# Patient Record
Sex: MALE | Race: WHITE | Employment: OTHER | URBAN - METROPOLITAN AREA
[De-identification: names, ages, dates, MRNs, and addresses within clinical notes are randomized per-mention and may not be internally consistent; named-entity substitution may affect disease eponyms.]

---

## 2018-07-27 ENCOUNTER — HOSPITAL ENCOUNTER (OUTPATIENT)
Age: 68
Discharge: HOME | End: 2018-07-29
Payer: MEDICARE

## 2018-07-27 ENCOUNTER — HOSPITAL ENCOUNTER (OUTPATIENT)
Dept: ULTRASOUND IMAGING | Age: 68
Discharge: HOME | End: 2018-07-29
Payer: MEDICARE

## 2018-07-27 DIAGNOSIS — I82.401 ACUTE EMBOLISM AND THROMBOSIS OF DEEP VEIN OF RIGHT LOWER EXTREMITY (HCC): ICD-10-CM

## 2018-07-27 PROCEDURE — 93971 EXTREMITY STUDY: CPT

## 2018-11-05 ENCOUNTER — HOSPITAL ENCOUNTER (OUTPATIENT)
Age: 68
Discharge: HOME | End: 2018-11-07
Payer: MEDICARE

## 2018-11-05 LAB
ANION GAP SERPL CALCULATED.3IONS-SCNC: 14 MMOL/L (ref 7–16)
BUN BLDV-MCNC: 20 MG/DL (ref 8–23)
CALCIUM SERPL-MCNC: 10 MG/DL (ref 8.6–10.2)
CHLORIDE BLD-SCNC: 99 MMOL/L (ref 98–107)
CO2: 26 MMOL/L (ref 22–29)
CREAT SERPL-MCNC: 0.9 MG/DL (ref 0.7–1.2)
FOLLICLE STIMULATING HORMONE: 4.4 MIU/ML
GFR AFRICAN AMERICAN: >60
GFR NON-AFRICAN AMERICAN: >60 ML/MIN/1.73
GLUCOSE BLD-MCNC: 140 MG/DL (ref 74–99)
LUTEINIZING HORMONE: 6.2 MIU/ML
POTASSIUM SERPL-SCNC: 4.3 MMOL/L (ref 3.5–5)
SODIUM BLD-SCNC: 139 MMOL/L (ref 132–146)
TESTOSTERONE TOTAL: 387.9 NG/DL

## 2018-11-05 PROCEDURE — 84403 ASSAY OF TOTAL TESTOSTERONE: CPT

## 2018-11-05 PROCEDURE — 80048 BASIC METABOLIC PNL TOTAL CA: CPT

## 2018-11-05 PROCEDURE — 83001 ASSAY OF GONADOTROPIN (FSH): CPT

## 2018-11-05 PROCEDURE — 83002 ASSAY OF GONADOTROPIN (LH): CPT

## 2019-05-01 ENCOUNTER — HOSPITAL ENCOUNTER (OUTPATIENT)
Age: 69
Discharge: HOME | End: 2019-05-03

## 2019-05-01 PROCEDURE — 88304 TISSUE EXAM BY PATHOLOGIST: CPT

## 2019-06-18 ENCOUNTER — HOSPITAL ENCOUNTER (OUTPATIENT)
Age: 69
Discharge: HOME | End: 2019-06-20
Payer: MEDICARE

## 2019-06-18 PROCEDURE — 88305 TISSUE EXAM BY PATHOLOGIST: CPT

## 2019-06-20 ENCOUNTER — HOSPITAL ENCOUNTER (OUTPATIENT)
Age: 69
Discharge: HOME | End: 2019-06-22
Payer: MEDICARE

## 2019-06-20 LAB — PROSTATE SPECIFIC ANTIGEN: 0.47 NG/ML (ref 0–4)

## 2019-06-20 PROCEDURE — G0103 PSA SCREENING: HCPCS

## 2020-11-13 ENCOUNTER — HOSPITAL ENCOUNTER (OUTPATIENT)
Age: 70
Discharge: HOME | End: 2020-11-15

## 2020-11-13 PROCEDURE — 88305 TISSUE EXAM BY PATHOLOGIST: CPT

## 2020-11-13 PROCEDURE — 87081 CULTURE SCREEN ONLY: CPT

## 2020-11-14 LAB — CLOTEST: NORMAL

## 2022-01-05 RX ORDER — ALBUTEROL SULFATE 90 UG/1
4 AEROSOL, METERED RESPIRATORY (INHALATION) PRN
Status: CANCELLED | OUTPATIENT
Start: 2022-01-05

## 2022-01-05 RX ORDER — SODIUM CHLORIDE 9 MG/ML
INJECTION, SOLUTION INTRAVENOUS CONTINUOUS PRN
Status: CANCELLED | OUTPATIENT
Start: 2022-01-05 | End: 2022-01-06

## 2022-01-05 RX ORDER — METHYLPREDNISOLONE SODIUM SUCCINATE 125 MG/2ML
125 INJECTION, POWDER, LYOPHILIZED, FOR SOLUTION INTRAMUSCULAR; INTRAVENOUS
Status: CANCELLED | OUTPATIENT
Start: 2022-01-05 | End: 2022-01-05

## 2022-01-05 RX ORDER — SODIUM CHLORIDE 0.9 % (FLUSH) 0.9 %
5-40 SYRINGE (ML) INJECTION EVERY 12 HOURS SCHEDULED
Status: CANCELLED | OUTPATIENT
Start: 2022-01-05

## 2022-01-05 RX ORDER — SODIUM CHLORIDE 9 MG/ML
100 INJECTION, SOLUTION INTRAVENOUS CONTINUOUS PRN
Status: CANCELLED | OUTPATIENT
Start: 2022-01-05

## 2022-01-05 RX ORDER — ACETAMINOPHEN 325 MG/1
650 TABLET ORAL
Status: CANCELLED | OUTPATIENT
Start: 2022-01-05 | End: 2022-01-05

## 2022-01-05 RX ORDER — SODIUM CHLORIDE 0.9 % (FLUSH) 0.9 %
5-40 SYRINGE (ML) INJECTION PRN
Status: CANCELLED | OUTPATIENT
Start: 2022-01-05

## 2022-01-05 RX ORDER — EPINEPHRINE 1 MG/ML
0.3 INJECTION, SOLUTION, CONCENTRATE INTRAVENOUS PRN
Status: CANCELLED | OUTPATIENT
Start: 2022-01-05

## 2022-01-05 RX ORDER — ONDANSETRON 2 MG/ML
8 INJECTION INTRAMUSCULAR; INTRAVENOUS
Status: CANCELLED | OUTPATIENT
Start: 2022-01-05 | End: 2022-01-05

## 2022-01-05 RX ORDER — DIPHENHYDRAMINE HYDROCHLORIDE 50 MG/ML
50 INJECTION INTRAMUSCULAR; INTRAVENOUS
Status: CANCELLED | OUTPATIENT
Start: 2022-01-05 | End: 2022-01-05

## 2022-01-05 RX ORDER — SODIUM CHLORIDE 9 MG/ML
25 INJECTION, SOLUTION INTRAVENOUS PRN
Status: CANCELLED | OUTPATIENT
Start: 2022-01-05

## 2022-01-06 ENCOUNTER — HOSPITAL ENCOUNTER (OUTPATIENT)
Dept: INFUSION THERAPY | Age: 72
Setting detail: INFUSION SERIES
Discharge: HOME | End: 2022-01-06
Payer: MEDICARE

## 2022-01-06 VITALS
DIASTOLIC BLOOD PRESSURE: 62 MMHG | HEART RATE: 67 BPM | OXYGEN SATURATION: 96 % | HEIGHT: 72 IN | WEIGHT: 235 LBS | SYSTOLIC BLOOD PRESSURE: 122 MMHG | BODY MASS INDEX: 31.83 KG/M2 | RESPIRATION RATE: 16 BRPM | TEMPERATURE: 97.6 F

## 2022-01-06 PROCEDURE — 2580000003 HC RX 258: Performed by: FAMILY MEDICINE

## 2022-01-06 PROCEDURE — M0243 CASIRIVI AND IMDEVI INFUSION: HCPCS

## 2022-01-06 PROCEDURE — 6360000002 HC RX W HCPCS: Performed by: FAMILY MEDICINE

## 2022-01-06 RX ORDER — SODIUM CHLORIDE 9 MG/ML
INJECTION, SOLUTION INTRAVENOUS CONTINUOUS PRN
Status: ACTIVE | OUTPATIENT
Start: 2022-01-06 | End: 2022-01-06

## 2022-01-06 RX ORDER — SODIUM CHLORIDE 0.9 % (FLUSH) 0.9 %
5-40 SYRINGE (ML) INJECTION PRN
Status: DISCONTINUED | OUTPATIENT
Start: 2022-01-06 | End: 2022-01-07 | Stop reason: HOSPADM

## 2022-01-06 RX ADMIN — SODIUM CHLORIDE, PRESERVATIVE FREE 10 ML: 5 INJECTION INTRAVENOUS at 13:48

## 2022-01-06 RX ADMIN — SODIUM CHLORIDE: 9 INJECTION, SOLUTION INTRAVENOUS at 12:45

## 2022-01-06 RX ADMIN — SODIUM CHLORIDE, PRESERVATIVE FREE 10 ML: 5 INJECTION INTRAVENOUS at 12:45

## 2022-01-06 RX ADMIN — Medication 1200 MG: at 12:46

## 2022-01-06 NOTE — PROGRESS NOTES
Patient arrived to receive Covid Monoclonal infusion. Patient was given education handouts on the medication and information sheet on 10 things to do when you have COVID-19. Patient was explained the risk and benefits of receiving the infusion. Verbal consent was obtained.

## 2022-10-11 ENCOUNTER — HOSPITAL ENCOUNTER (OUTPATIENT)
Dept: ULTRASOUND IMAGING | Age: 72
Discharge: HOME | End: 2022-10-13
Payer: MEDICARE

## 2022-10-11 ENCOUNTER — HOSPITAL ENCOUNTER (OUTPATIENT)
Age: 72
Discharge: HOME | End: 2022-10-13
Payer: MEDICARE

## 2022-10-11 DIAGNOSIS — I10 ESSENTIAL HYPERTENSION, BENIGN: ICD-10-CM

## 2022-10-11 PROCEDURE — 76705 ECHO EXAM OF ABDOMEN: CPT

## 2023-09-22 ENCOUNTER — TRANSCRIBE ORDERS (OUTPATIENT)
Dept: ADMINISTRATIVE | Age: 73
End: 2023-09-22

## 2023-09-22 DIAGNOSIS — M54.2 CERVICALGIA: Primary | ICD-10-CM

## 2024-01-10 ENCOUNTER — HOSPITAL ENCOUNTER (OUTPATIENT)
Dept: CT IMAGING | Age: 74
Discharge: HOME | End: 2024-01-12
Payer: MEDICARE

## 2024-01-10 ENCOUNTER — HOSPITAL ENCOUNTER (OUTPATIENT)
Age: 74
Discharge: HOME | End: 2024-01-12
Payer: MEDICARE

## 2024-01-10 DIAGNOSIS — R10.9 STOMACH ACHE: ICD-10-CM

## 2024-01-10 PROCEDURE — 74176 CT ABD & PELVIS W/O CONTRAST: CPT

## 2024-02-14 DIAGNOSIS — N28.1 ACQUIRED CYST OF KIDNEY: Primary | ICD-10-CM

## 2025-05-08 ENCOUNTER — HOSPITAL ENCOUNTER (EMERGENCY)
Age: 75
Discharge: HOME | End: 2025-05-08
Attending: EMERGENCY MEDICINE
Payer: MEDICARE

## 2025-05-08 VITALS
WEIGHT: 202 LBS | HEIGHT: 71 IN | HEART RATE: 81 BPM | DIASTOLIC BLOOD PRESSURE: 75 MMHG | TEMPERATURE: 98.1 F | SYSTOLIC BLOOD PRESSURE: 148 MMHG | OXYGEN SATURATION: 98 % | BODY MASS INDEX: 28.28 KG/M2 | RESPIRATION RATE: 15 BRPM

## 2025-05-08 DIAGNOSIS — R73.9 HYPERGLYCEMIA: ICD-10-CM

## 2025-05-08 DIAGNOSIS — E86.0 DEHYDRATION: Primary | ICD-10-CM

## 2025-05-08 LAB
ALBUMIN SERPL-MCNC: 3.9 G/DL (ref 3.5–5.2)
ALP SERPL-CCNC: 83 U/L (ref 40–129)
ALT SERPL-CCNC: 13 U/L (ref 0–40)
ANION GAP SERPL CALCULATED.3IONS-SCNC: 11 MMOL/L (ref 7–16)
AST SERPL-CCNC: 33 U/L (ref 0–39)
BASOPHILS # BLD: 0.03 K/UL (ref 0–0.2)
BASOPHILS NFR BLD: 0 % (ref 0–2)
BILIRUB DIRECT SERPL-MCNC: <0.2 MG/DL (ref 0–0.3)
BILIRUB INDIRECT SERPL-MCNC: NORMAL MG/DL (ref 0–1)
BILIRUB SERPL-MCNC: 0.8 MG/DL (ref 0–1.2)
BILIRUB UR QL STRIP: NEGATIVE
BUN SERPL-MCNC: 13 MG/DL (ref 6–23)
CALCIUM SERPL-MCNC: 9.8 MG/DL (ref 8.6–10.2)
CHLORIDE SERPL-SCNC: 100 MMOL/L (ref 98–107)
CLARITY UR: CLEAR
CO2 SERPL-SCNC: 22 MMOL/L (ref 22–29)
COLOR UR: YELLOW
CREAT SERPL-MCNC: 1 MG/DL (ref 0.7–1.2)
EOSINOPHIL # BLD: 0.06 K/UL (ref 0.05–0.5)
EOSINOPHILS RELATIVE PERCENT: 1 % (ref 0–6)
ERYTHROCYTE [DISTWIDTH] IN BLOOD BY AUTOMATED COUNT: 12.9 % (ref 11.5–15)
GFR, ESTIMATED: 82 ML/MIN/1.73M2
GLUCOSE BLD-MCNC: 218 MG/DL (ref 74–99)
GLUCOSE SERPL-MCNC: 227 MG/DL (ref 74–99)
GLUCOSE UR STRIP-MCNC: 250 MG/DL
HCT VFR BLD AUTO: 40.8 % (ref 37–54)
HGB BLD-MCNC: 13.6 G/DL (ref 12.5–16.5)
HGB UR QL STRIP.AUTO: ABNORMAL
IMM GRANULOCYTES # BLD AUTO: 0.05 K/UL (ref 0–0.58)
IMM GRANULOCYTES NFR BLD: 0 % (ref 0–5)
KETONES UR STRIP-MCNC: 15 MG/DL
LACTATE BLDV-SCNC: 1.1 MMOL/L (ref 0.5–1.9)
LEUKOCYTE ESTERASE UR QL STRIP: NEGATIVE
LYMPHOCYTES NFR BLD: 1.93 K/UL (ref 1.5–4)
LYMPHOCYTES RELATIVE PERCENT: 17 % (ref 20–42)
MCH RBC QN AUTO: 31 PG (ref 26–35)
MCHC RBC AUTO-ENTMCNC: 33.3 G/DL (ref 32–34.5)
MCV RBC AUTO: 92.9 FL (ref 80–99.9)
MONOCYTES NFR BLD: 0.88 K/UL (ref 0.1–0.95)
MONOCYTES NFR BLD: 8 % (ref 2–12)
NEUTROPHILS NFR BLD: 74 % (ref 43–80)
NEUTS SEG NFR BLD: 8.22 K/UL (ref 1.8–7.3)
NITRITE UR QL STRIP: NEGATIVE
PH UR STRIP: 6 [PH] (ref 5–8)
PLATELET CONFIRMATION: NORMAL
PLATELET, FLUORESCENCE: 253 K/UL (ref 130–450)
PMV BLD AUTO: 10.3 FL (ref 7–12)
POTASSIUM SERPL-SCNC: 5.2 MMOL/L (ref 3.5–5)
PROT SERPL-MCNC: 7.5 G/DL (ref 6.4–8.3)
PROT UR STRIP-MCNC: 100 MG/DL
RBC # BLD AUTO: 4.39 M/UL (ref 3.8–5.8)
RBC #/AREA URNS HPF: ABNORMAL /HPF
SODIUM SERPL-SCNC: 133 MMOL/L (ref 132–146)
SP GR UR STRIP: 1.02 (ref 1–1.03)
UROBILINOGEN UR STRIP-ACNC: 0.2 EU/DL (ref 0–1)
WBC #/AREA URNS HPF: ABNORMAL /HPF
WBC OTHER # BLD: 11.2 K/UL (ref 4.5–11.5)

## 2025-05-08 PROCEDURE — 82248 BILIRUBIN DIRECT: CPT

## 2025-05-08 PROCEDURE — 93005 ELECTROCARDIOGRAM TRACING: CPT | Performed by: EMERGENCY MEDICINE

## 2025-05-08 PROCEDURE — 82962 GLUCOSE BLOOD TEST: CPT

## 2025-05-08 PROCEDURE — 96360 HYDRATION IV INFUSION INIT: CPT

## 2025-05-08 PROCEDURE — 81001 URINALYSIS AUTO W/SCOPE: CPT

## 2025-05-08 PROCEDURE — 80053 COMPREHEN METABOLIC PANEL: CPT

## 2025-05-08 PROCEDURE — 85025 COMPLETE CBC W/AUTO DIFF WBC: CPT

## 2025-05-08 PROCEDURE — 99284 EMERGENCY DEPT VISIT MOD MDM: CPT

## 2025-05-08 PROCEDURE — 87040 BLOOD CULTURE FOR BACTERIA: CPT

## 2025-05-08 PROCEDURE — 6370000000 HC RX 637 (ALT 250 FOR IP): Performed by: EMERGENCY MEDICINE

## 2025-05-08 PROCEDURE — 2580000003 HC RX 258: Performed by: EMERGENCY MEDICINE

## 2025-05-08 PROCEDURE — 87086 URINE CULTURE/COLONY COUNT: CPT

## 2025-05-08 PROCEDURE — 83605 ASSAY OF LACTIC ACID: CPT

## 2025-05-08 PROCEDURE — 96361 HYDRATE IV INFUSION ADD-ON: CPT

## 2025-05-08 RX ORDER — 0.9 % SODIUM CHLORIDE 0.9 %
1000 INTRAVENOUS SOLUTION INTRAVENOUS ONCE
Status: COMPLETED | OUTPATIENT
Start: 2025-05-08 | End: 2025-05-08

## 2025-05-08 RX ORDER — IBUPROFEN 600 MG/1
600 TABLET, FILM COATED ORAL ONCE
Status: COMPLETED | OUTPATIENT
Start: 2025-05-08 | End: 2025-05-08

## 2025-05-08 RX ADMIN — SODIUM CHLORIDE 1000 ML: 9 INJECTION, SOLUTION INTRAVENOUS at 14:51

## 2025-05-08 RX ADMIN — IBUPROFEN 600 MG: 600 TABLET, FILM COATED ORAL at 15:24

## 2025-05-08 NOTE — ED PROVIDER NOTES
HPI:  5/8/25, Time: 12:18 PM EDT         Bob Nash is a 75 y.o. male presenting to the ED for chills. Patient states that about 10 days ago he had multiple tooth extractions and implants placed.  He states that he is been having intermittent chills with pain and difficulty sleeping and eating since.  He states he has been taking hydrocodone with Tylenol.  He has a history of ELIZABETH.  He states he has been constipated and also having difficulty urinating but no dysuria.  No documented fevers, cough, URI symptoms, chest pain, shortness of breath, abdominal pain, emesis, or diarrhea.  Called his PCP recommended that he come to the ED to be evaluated for sepsis.  Patient has stable vital signs on arrival.    I reviewed the patient's chart.  Patient seen by neurosurgery at [facility] on 9/29/2023 for left-sided neck pain without inciting event.  Symptoms likely muscular.  Patient has a history of BPH, diabetes, hypertension, and CKD.    Review of Systems:  Pertinent positives and negatives as per HPI.    --------------------------------------------- PAST HISTORY ---------------------------------------------  Past Medical History:  has no past medical history on file.    Past Surgical History:  has no past surgical history on file.    Social History:      Family History: family history is not on file.     The patient’s home medications have been reviewed.    Allergies: Pcn [penicillins] and Sulfa antibiotics    -------------------------------------------------- RESULTS -------------------------------------------------  All laboratory and radiology results have been personally reviewed by myself   LABS:  Results for orders placed or performed during the hospital encounter of 05/08/25   Culture, Blood 1    Specimen: Blood   Result Value Ref Range    Specimen Description .BLOOD     Special Requests          Culture NO GROWTH <24 HRS    Culture, Blood 2    Specimen: Blood   Result Value Ref Range    Specimen Description .BLOOD

## 2025-05-09 LAB
EKG ATRIAL RATE: 64 BPM
EKG P AXIS: 2 DEGREES
EKG P-R INTERVAL: 222 MS
EKG Q-T INTERVAL: 374 MS
EKG QRS DURATION: 88 MS
EKG QTC CALCULATION (BAZETT): 385 MS
EKG R AXIS: -9 DEGREES
EKG T AXIS: 20 DEGREES
EKG VENTRICULAR RATE: 64 BPM
MICROORGANISM SPEC CULT: NO GROWTH
SERVICE CMNT-IMP: NORMAL
SPECIMEN DESCRIPTION: NORMAL

## 2025-05-09 PROCEDURE — 93010 ELECTROCARDIOGRAM REPORT: CPT | Performed by: INTERNAL MEDICINE

## 2025-05-11 LAB
MICROORGANISM SPEC CULT: NORMAL
MICROORGANISM SPEC CULT: NORMAL
SERVICE CMNT-IMP: NORMAL
SERVICE CMNT-IMP: NORMAL
SPECIMEN DESCRIPTION: NORMAL
SPECIMEN DESCRIPTION: NORMAL

## 2025-05-19 ENCOUNTER — HOSPITAL ENCOUNTER (EMERGENCY)
Age: 75
Discharge: HOME | DRG: 158 | End: 2025-05-19
Attending: EMERGENCY MEDICINE
Payer: MEDICARE

## 2025-05-19 ENCOUNTER — APPOINTMENT (OUTPATIENT)
Dept: CT IMAGING | Age: 75
DRG: 158 | End: 2025-05-19
Payer: MEDICARE

## 2025-05-19 VITALS
RESPIRATION RATE: 16 BRPM | TEMPERATURE: 97.5 F | SYSTOLIC BLOOD PRESSURE: 145 MMHG | DIASTOLIC BLOOD PRESSURE: 82 MMHG | OXYGEN SATURATION: 97 % | BODY MASS INDEX: 28.28 KG/M2 | HEART RATE: 69 BPM | WEIGHT: 202 LBS | HEIGHT: 71 IN

## 2025-05-19 DIAGNOSIS — M27.62: Primary | ICD-10-CM

## 2025-05-19 DIAGNOSIS — K08.89 PAIN, DENTAL: ICD-10-CM

## 2025-05-19 LAB
ANION GAP SERPL CALCULATED.3IONS-SCNC: 13 MMOL/L (ref 7–16)
BASOPHILS # BLD: 0.04 K/UL (ref 0–0.2)
BASOPHILS NFR BLD: 1 % (ref 0–2)
BUN SERPL-MCNC: 21 MG/DL (ref 6–23)
CALCIUM SERPL-MCNC: 10.2 MG/DL (ref 8.6–10.2)
CHLORIDE SERPL-SCNC: 103 MMOL/L (ref 98–107)
CO2 SERPL-SCNC: 21 MMOL/L (ref 22–29)
CREAT SERPL-MCNC: 1.3 MG/DL (ref 0.7–1.2)
CRP SERPL HS-MCNC: 4.5 MG/L (ref 0–5)
EOSINOPHIL # BLD: 0.13 K/UL (ref 0.05–0.5)
EOSINOPHILS RELATIVE PERCENT: 2 % (ref 0–6)
ERYTHROCYTE [DISTWIDTH] IN BLOOD BY AUTOMATED COUNT: 12.6 % (ref 11.5–15)
GFR, ESTIMATED: 59 ML/MIN/1.73M2
GLUCOSE BLD-MCNC: 148 MG/DL (ref 74–99)
GLUCOSE SERPL-MCNC: 142 MG/DL (ref 74–99)
HCT VFR BLD AUTO: 37.7 % (ref 37–54)
HGB BLD-MCNC: 12.6 G/DL (ref 12.5–16.5)
IMM GRANULOCYTES # BLD AUTO: 0.03 K/UL (ref 0–0.58)
IMM GRANULOCYTES NFR BLD: 0 % (ref 0–5)
LYMPHOCYTES NFR BLD: 1.87 K/UL (ref 1.5–4)
LYMPHOCYTES RELATIVE PERCENT: 22 % (ref 20–42)
MCH RBC QN AUTO: 30.8 PG (ref 26–35)
MCHC RBC AUTO-ENTMCNC: 33.4 G/DL (ref 32–34.5)
MCV RBC AUTO: 92.2 FL (ref 80–99.9)
MONOCYTES NFR BLD: 0.47 K/UL (ref 0.1–0.95)
MONOCYTES NFR BLD: 6 % (ref 2–12)
NEUTROPHILS NFR BLD: 70 % (ref 43–80)
NEUTS SEG NFR BLD: 5.8 K/UL (ref 1.8–7.3)
PLATELET # BLD AUTO: 324 K/UL (ref 130–450)
PMV BLD AUTO: 9.7 FL (ref 7–12)
POTASSIUM SERPL-SCNC: 4.4 MMOL/L (ref 3.5–5)
RBC # BLD AUTO: 4.09 M/UL (ref 3.8–5.8)
SODIUM SERPL-SCNC: 137 MMOL/L (ref 132–146)
WBC OTHER # BLD: 8.3 K/UL (ref 4.5–11.5)

## 2025-05-19 PROCEDURE — 87205 SMEAR GRAM STAIN: CPT

## 2025-05-19 PROCEDURE — 96374 THER/PROPH/DIAG INJ IV PUSH: CPT

## 2025-05-19 PROCEDURE — 2580000003 HC RX 258: Performed by: PHYSICIAN ASSISTANT

## 2025-05-19 PROCEDURE — 87070 CULTURE OTHR SPECIMN AEROBIC: CPT

## 2025-05-19 PROCEDURE — 6360000004 HC RX CONTRAST MEDICATION: Performed by: RADIOLOGY

## 2025-05-19 PROCEDURE — 99285 EMERGENCY DEPT VISIT HI MDM: CPT

## 2025-05-19 PROCEDURE — 86140 C-REACTIVE PROTEIN: CPT

## 2025-05-19 PROCEDURE — 87077 CULTURE AEROBIC IDENTIFY: CPT

## 2025-05-19 PROCEDURE — 85025 COMPLETE CBC W/AUTO DIFF WBC: CPT

## 2025-05-19 PROCEDURE — 82962 GLUCOSE BLOOD TEST: CPT

## 2025-05-19 PROCEDURE — 6360000002 HC RX W HCPCS: Performed by: PHYSICIAN ASSISTANT

## 2025-05-19 PROCEDURE — 87075 CULTR BACTERIA EXCEPT BLOOD: CPT

## 2025-05-19 PROCEDURE — 70487 CT MAXILLOFACIAL W/DYE: CPT

## 2025-05-19 PROCEDURE — 96375 TX/PRO/DX INJ NEW DRUG ADDON: CPT

## 2025-05-19 PROCEDURE — 87040 BLOOD CULTURE FOR BACTERIA: CPT

## 2025-05-19 PROCEDURE — 80048 BASIC METABOLIC PNL TOTAL CA: CPT

## 2025-05-19 RX ORDER — MORPHINE SULFATE 4 MG/ML
4 INJECTION, SOLUTION INTRAMUSCULAR; INTRAVENOUS ONCE
Refills: 0 | Status: DISCONTINUED | OUTPATIENT
Start: 2025-05-19 | End: 2025-05-19

## 2025-05-19 RX ORDER — ONDANSETRON 2 MG/ML
4 INJECTION INTRAMUSCULAR; INTRAVENOUS EVERY 6 HOURS PRN
Status: DISCONTINUED | OUTPATIENT
Start: 2025-05-19 | End: 2025-05-20 | Stop reason: HOSPADM

## 2025-05-19 RX ORDER — IOPAMIDOL 755 MG/ML
75 INJECTION, SOLUTION INTRAVASCULAR
Status: COMPLETED | OUTPATIENT
Start: 2025-05-19 | End: 2025-05-19

## 2025-05-19 RX ORDER — HYDROCODONE BITARTRATE AND ACETAMINOPHEN 5; 325 MG/1; MG/1
1 TABLET ORAL EVERY 6 HOURS PRN
Qty: 12 TABLET | Refills: 0 | Status: ON HOLD | OUTPATIENT
Start: 2025-05-19 | End: 2025-05-23 | Stop reason: HOSPADM

## 2025-05-19 RX ORDER — MORPHINE SULFATE 2 MG/ML
2 INJECTION, SOLUTION INTRAMUSCULAR; INTRAVENOUS ONCE
Status: COMPLETED | OUTPATIENT
Start: 2025-05-19 | End: 2025-05-19

## 2025-05-19 RX ORDER — 0.9 % SODIUM CHLORIDE 0.9 %
500 INTRAVENOUS SOLUTION INTRAVENOUS ONCE
Status: COMPLETED | OUTPATIENT
Start: 2025-05-19 | End: 2025-05-19

## 2025-05-19 RX ADMIN — MORPHINE SULFATE 2 MG: 2 INJECTION, SOLUTION INTRAMUSCULAR; INTRAVENOUS at 22:25

## 2025-05-19 RX ADMIN — ONDANSETRON 4 MG: 2 INJECTION, SOLUTION INTRAMUSCULAR; INTRAVENOUS at 22:26

## 2025-05-19 RX ADMIN — IOPAMIDOL 75 ML: 755 INJECTION, SOLUTION INTRAVENOUS at 20:22

## 2025-05-19 RX ADMIN — SODIUM CHLORIDE 500 ML: 0.9 INJECTION, SOLUTION INTRAVENOUS at 19:22

## 2025-05-19 ASSESSMENT — PAIN DESCRIPTION - LOCATION
LOCATION: TEETH
LOCATION: TEETH
LOCATION: TEETH;MOUTH

## 2025-05-19 ASSESSMENT — PAIN SCALES - GENERAL
PAINLEVEL_OUTOF10: 6
PAINLEVEL_OUTOF10: 3
PAINLEVEL_OUTOF10: 8

## 2025-05-19 ASSESSMENT — PAIN DESCRIPTION - ORIENTATION
ORIENTATION: MID;UPPER
ORIENTATION: MID;UPPER

## 2025-05-19 ASSESSMENT — PAIN - FUNCTIONAL ASSESSMENT: PAIN_FUNCTIONAL_ASSESSMENT: 0-10

## 2025-05-19 ASSESSMENT — LIFESTYLE VARIABLES
HOW OFTEN DO YOU HAVE A DRINK CONTAINING ALCOHOL: NEVER
HOW MANY STANDARD DRINKS CONTAINING ALCOHOL DO YOU HAVE ON A TYPICAL DAY: PATIENT DOES NOT DRINK

## 2025-05-19 ASSESSMENT — PAIN DESCRIPTION - DESCRIPTORS: DESCRIPTORS: THROBBING

## 2025-05-20 ENCOUNTER — APPOINTMENT (OUTPATIENT)
Dept: GENERAL RADIOLOGY | Age: 75
DRG: 920 | End: 2025-05-20
Payer: MEDICARE

## 2025-05-20 ENCOUNTER — HOSPITAL ENCOUNTER (INPATIENT)
Age: 75
LOS: 3 days | Discharge: HOME HEALTH | DRG: 920 | End: 2025-05-23
Attending: EMERGENCY MEDICINE | Admitting: INTERNAL MEDICINE
Payer: MEDICARE

## 2025-05-20 DIAGNOSIS — R07.9 CHEST PAIN, UNSPECIFIED TYPE: ICD-10-CM

## 2025-05-20 DIAGNOSIS — K08.89 PAIN, DENTAL: ICD-10-CM

## 2025-05-20 DIAGNOSIS — M27.62: Primary | ICD-10-CM

## 2025-05-20 LAB
ALBUMIN SERPL-MCNC: 4.2 G/DL (ref 3.5–5.2)
ALP SERPL-CCNC: 84 U/L (ref 40–129)
ALT SERPL-CCNC: 14 U/L (ref 0–50)
ANION GAP SERPL CALCULATED.3IONS-SCNC: 14 MMOL/L (ref 7–16)
AST SERPL-CCNC: 20 U/L (ref 0–50)
BASOPHILS # BLD: 0.03 K/UL (ref 0–0.2)
BASOPHILS NFR BLD: 0 % (ref 0–2)
BILIRUB SERPL-MCNC: 0.3 MG/DL (ref 0–1.2)
BUN SERPL-MCNC: 23 MG/DL (ref 8–23)
CALCIUM SERPL-MCNC: 10.2 MG/DL (ref 8.8–10.2)
CHLORIDE SERPL-SCNC: 104 MMOL/L (ref 98–107)
CO2 SERPL-SCNC: 22 MMOL/L (ref 22–29)
CREAT SERPL-MCNC: 1.3 MG/DL (ref 0.7–1.2)
EOSINOPHIL # BLD: 0.1 K/UL (ref 0.05–0.5)
EOSINOPHILS RELATIVE PERCENT: 1 % (ref 0–6)
ERYTHROCYTE [DISTWIDTH] IN BLOOD BY AUTOMATED COUNT: 12.7 % (ref 11.5–15)
GFR, ESTIMATED: 56 ML/MIN/1.73M2
GLUCOSE SERPL-MCNC: 172 MG/DL (ref 74–99)
HCT VFR BLD AUTO: 37.7 % (ref 37–54)
HGB BLD-MCNC: 12.8 G/DL (ref 12.5–16.5)
IMM GRANULOCYTES # BLD AUTO: 0.03 K/UL (ref 0–0.58)
IMM GRANULOCYTES NFR BLD: 0 % (ref 0–5)
LYMPHOCYTES NFR BLD: 2.04 K/UL (ref 1.5–4)
LYMPHOCYTES RELATIVE PERCENT: 26 % (ref 20–42)
MCH RBC QN AUTO: 31.1 PG (ref 26–35)
MCHC RBC AUTO-ENTMCNC: 34 G/DL (ref 32–34.5)
MCV RBC AUTO: 91.7 FL (ref 80–99.9)
MONOCYTES NFR BLD: 0.47 K/UL (ref 0.1–0.95)
MONOCYTES NFR BLD: 6 % (ref 2–12)
NEUTROPHILS NFR BLD: 66 % (ref 43–80)
NEUTS SEG NFR BLD: 5.21 K/UL (ref 1.8–7.3)
PLATELET # BLD AUTO: 315 K/UL (ref 130–450)
PMV BLD AUTO: 9.9 FL (ref 7–12)
POTASSIUM SERPL-SCNC: 4.6 MMOL/L (ref 3.5–5.1)
PROT SERPL-MCNC: 7.6 G/DL (ref 6.4–8.3)
RBC # BLD AUTO: 4.11 M/UL (ref 3.8–5.8)
SODIUM SERPL-SCNC: 140 MMOL/L (ref 136–145)
TROPONIN I SERPL HS-MCNC: 20 NG/L (ref 0–22)
TROPONIN I SERPL HS-MCNC: 25 NG/L (ref 0–22)
WBC OTHER # BLD: 7.9 K/UL (ref 4.5–11.5)

## 2025-05-20 PROCEDURE — 96375 TX/PRO/DX INJ NEW DRUG ADDON: CPT

## 2025-05-20 PROCEDURE — 93005 ELECTROCARDIOGRAM TRACING: CPT | Performed by: NURSE PRACTITIONER

## 2025-05-20 PROCEDURE — 96365 THER/PROPH/DIAG IV INF INIT: CPT

## 2025-05-20 PROCEDURE — 99285 EMERGENCY DEPT VISIT HI MDM: CPT

## 2025-05-20 PROCEDURE — 85025 COMPLETE CBC W/AUTO DIFF WBC: CPT

## 2025-05-20 PROCEDURE — 2580000003 HC RX 258: Performed by: NURSE PRACTITIONER

## 2025-05-20 PROCEDURE — 84484 ASSAY OF TROPONIN QUANT: CPT

## 2025-05-20 PROCEDURE — 2060000000 HC ICU INTERMEDIATE R&B

## 2025-05-20 PROCEDURE — 6370000000 HC RX 637 (ALT 250 FOR IP): Performed by: NURSE PRACTITIONER

## 2025-05-20 PROCEDURE — 71045 X-RAY EXAM CHEST 1 VIEW: CPT

## 2025-05-20 PROCEDURE — 80053 COMPREHEN METABOLIC PANEL: CPT

## 2025-05-20 PROCEDURE — 6360000002 HC RX W HCPCS: Performed by: NURSE PRACTITIONER

## 2025-05-20 PROCEDURE — 02HV33Z INSERTION OF INFUSION DEVICE INTO SUPERIOR VENA CAVA, PERCUTANEOUS APPROACH: ICD-10-PCS | Performed by: INTERNAL MEDICINE

## 2025-05-20 RX ORDER — KETOROLAC TROMETHAMINE 30 MG/ML
15 INJECTION, SOLUTION INTRAMUSCULAR; INTRAVENOUS ONCE
Status: COMPLETED | OUTPATIENT
Start: 2025-05-20 | End: 2025-05-20

## 2025-05-20 RX ORDER — OXYCODONE HYDROCHLORIDE 5 MG/1
5 TABLET ORAL ONCE
Refills: 0 | Status: COMPLETED | OUTPATIENT
Start: 2025-05-20 | End: 2025-05-20

## 2025-05-20 RX ORDER — OXYCODONE HYDROCHLORIDE 5 MG/1
5 TABLET ORAL ONCE
Refills: 0 | Status: COMPLETED | OUTPATIENT
Start: 2025-05-20 | End: 2025-05-21

## 2025-05-20 RX ORDER — ASPIRIN 81 MG/1
324 TABLET, CHEWABLE ORAL ONCE
Status: COMPLETED | OUTPATIENT
Start: 2025-05-20 | End: 2025-05-20

## 2025-05-20 RX ADMIN — OXYCODONE HYDROCHLORIDE 5 MG: 5 TABLET ORAL at 21:00

## 2025-05-20 RX ADMIN — ASPIRIN 81 MG CHEWABLE TABLET 324 MG: 81 TABLET CHEWABLE at 22:01

## 2025-05-20 RX ADMIN — DOXYCYCLINE 100 MG: 100 INJECTION, POWDER, LYOPHILIZED, FOR SOLUTION INTRAVENOUS at 21:14

## 2025-05-20 RX ADMIN — KETOROLAC TROMETHAMINE 15 MG: 30 INJECTION, SOLUTION INTRAMUSCULAR at 20:59

## 2025-05-20 ASSESSMENT — PAIN DESCRIPTION - LOCATION
LOCATION: JAW
LOCATION: MOUTH

## 2025-05-20 ASSESSMENT — PAIN - FUNCTIONAL ASSESSMENT: PAIN_FUNCTIONAL_ASSESSMENT: 0-10

## 2025-05-20 ASSESSMENT — PAIN SCALES - GENERAL
PAINLEVEL_OUTOF10: 8
PAINLEVEL_OUTOF10: 8

## 2025-05-20 ASSESSMENT — PAIN DESCRIPTION - DESCRIPTORS: DESCRIPTORS: DISCOMFORT;NAGGING;THROBBING

## 2025-05-20 ASSESSMENT — HEART SCORE: ECG: NORMAL

## 2025-05-20 NOTE — ED PROVIDER NOTES
Shared RADU-ED Attending Visit.  CC: No      [place]  Department of Emergency Medicine   ED  Encounter Note  Admit Date/RoomTime: 2025  6:02 PM  ED Room:     NAME: Bob Nash  : 1950  MRN: [id number]     Chief Complaint:  Dental Problem (Had several implants today; sent here by dental today for infection/pain )    History of Present Illness        Bob Nash is a 75 y.o. old male who presents to the emergency department by private vehicle, for post-operative left upper dental and mouth pain, which occured 3 week(s) prior to arrival.  Pt had several teeth pulled and implant posts placed by his dentist and was clindamycin initially. He had persistent pain and swelling. His dentist put him on doxycycline next.  He was referred to Dr. Apple for second opinion.  He saw Dr. Apple this morning whose suggestion was extraction of all upper teeth, healing and reeval at that time for implants vs denture.  He went to his dentist this afternoon who removed one of the implant posts and cleaned out the area and he reports she said there was significant bony involvement and she consulted with Dr. Apple who suggested pt go to ED for IV antibiotics.  Pt denies fevers but has been having chills. He has been using motrin, tylenol, and norco for pain and is developing stomach upset. Pt has implant post from dentist with him.    ROS   Pertinent positives and negatives are stated within HPI, all other systems reviewed and are negative.    Past Medical History:  has no past medical history on file.    Surgical History:  has no past surgical history on file.    Social History:  reports that he has never smoked. He has never used smokeless tobacco. He reports that he does not drink alcohol and does not use drugs.    Family History: family history is not on file.     Allergies: Pcn [penicillins] and Sulfa antibiotics    Physical Exam   Oxygen Saturation Interpretation: Normal.        ED  - 34.5 g/dL    RDW 12.6 11.5 - 15.0 %    Platelets 324 130 - 450 k/uL    MPV 9.7 7.0 - 12.0 fL    Neutrophils % 70 43.0 - 80.0 %    Lymphocytes % 22 20.0 - 42.0 %    Monocytes % 6 2.0 - 12.0 %    Eosinophils % 2 0 - 6 %    Basophils % 1 0.0 - 2.0 %    Immature Granulocytes % 0 0.0 - 5.0 %    Neutrophils Absolute 5.80 1.80 - 7.30 k/uL    Lymphocytes Absolute 1.87 1.50 - 4.00 k/uL    Monocytes Absolute 0.47 0.10 - 0.95 k/uL    Eosinophils Absolute 0.13 0.05 - 0.50 k/uL    Basophils Absolute 0.04 0.00 - 0.20 k/uL    Immature Granulocytes Absolute 0.03 0.00 - 0.58 k/uL   Basic Metabolic Panel   Result Value Ref Range    Sodium 137 132 - 146 mmol/L    Potassium 4.4 3.5 - 5.0 mmol/L    Chloride 103 98 - 107 mmol/L    CO2 21 (L) 22 - 29 mmol/L    Anion Gap 13 7 - 16 mmol/L    Glucose 142 (H) 74 - 99 mg/dL    BUN 21 6 - 23 mg/dL    Creatinine 1.3 (H) 0.70 - 1.20 mg/dL    Est, Glom Filt Rate 59 (L) >60 mL/min/1.73m2    Calcium 10.2 8.6 - 10.2 mg/dL   C-Reactive Protein   Result Value Ref Range    CRP 4.5 0.0 - 5.0 mg/L   POCT Glucose   Result Value Ref Range    POC Glucose 148 (H) 74 - 99 mg/dL     Imaging:  All Radiology results interpreted by Radiologist unless otherwise noted.  CT FACIAL BONES W CONTRAST   Final Result   1. There is hypertrophy along the uvula with a central fluid collection that   may indicate underlying inflammatory change extending along the soft palate.   2. There are multiple dental caries involving the maxilla bilaterally.   3. Chronic maxillary and less likely ethmoid sinusitis.           ED Course / Medical Decision Making     Medications   sodium chloride 0.9 % bolus 500 mL (0 mLs IntraVENous Stopped 5/19/25 2017)   iopamidol (ISOVUE-370) 76 % injection 75 mL (75 mLs IntraVENous Given 5/19/25 2022)   morphine (PF) injection 2 mg (2 mg IntraVENous Given 5/19/25 2225)        Consult(s):   Dental Resident was consulted to see patient regarding complaint- antibiotics in ED or wait to ID or dentist

## 2025-05-20 NOTE — ED PROVIDER NOTES
Emergency Department Encounter  [facility] EMERGENCY DEPARTMENT    Patient: Bob  MRN: [id number]  : 1950  Date of Evaluation: 2025  ED Supervising Physician: Mendel Bowman MD    I personally evaluated Bob Nash and made/approved the management plan and take responsibility for the patient management.    This will serve as my Supervisory note and shared attestation.  I did perform a substantive portion of the visit including all aspects of the Medical Decision Making.    I wore appropriate PPE for the entirety of this encounter.    In brief, Bob is a 75 y.o. that presents to the emergency department concern for osteomyelitis of his maxilla after having dental implants placed    Focused exam: Vitals are stable he is awake alert well-appearing no acute distress no facial swelling or significant discomfort    Brief ED course/MDM: Plan to admit for IV antibiotics and MRI imaging to evaluate for maxillary osteomyelitis    Diagnostics interpreted by me:      I personally discussed the patient's management with other clinicians:      All diagnostic, treatment, and disposition decisions were made by myself in conjunction with the Resident. I also supervised key portions of any procedures performed by the Resident. For all further details of the patient's emergency department visit, please see their documentation.    (Comment: Please note this report has been produced using speech recognition software and may contain errors related to that system including errors in grammar, punctuation, and spelling, as well as words and phrases that may be inappropriate.  If there are any questions or concerns please feel free to contact the dictating provider for clarification.)    Mendel Bowman MD   Acute [facility]       Mendel Bowman MD  25

## 2025-05-21 PROBLEM — K04.7 DENTAL ABSCESS: Status: ACTIVE | Noted: 2025-05-21

## 2025-05-21 PROBLEM — R07.89 OTHER CHEST PAIN: Status: ACTIVE | Noted: 2025-05-21

## 2025-05-21 LAB
BASOPHILS # BLD: 0.03 K/UL (ref 0–0.2)
BASOPHILS NFR BLD: 1 % (ref 0–2)
EOSINOPHIL # BLD: 0.13 K/UL (ref 0.05–0.5)
EOSINOPHILS RELATIVE PERCENT: 2 % (ref 0–6)
ERYTHROCYTE [DISTWIDTH] IN BLOOD BY AUTOMATED COUNT: 12.5 % (ref 11.5–15)
GLUCOSE BLD-MCNC: 193 MG/DL (ref 74–99)
HBA1C MFR BLD: 7.3 % (ref 4–5.6)
HCT VFR BLD AUTO: 32.4 % (ref 37–54)
HGB BLD-MCNC: 11.2 G/DL (ref 12.5–16.5)
IMM GRANULOCYTES # BLD AUTO: <0.03 K/UL (ref 0–0.58)
IMM GRANULOCYTES NFR BLD: 0 % (ref 0–5)
LYMPHOCYTES NFR BLD: 2.01 K/UL (ref 1.5–4)
LYMPHOCYTES RELATIVE PERCENT: 33 % (ref 20–42)
MCH RBC QN AUTO: 31.3 PG (ref 26–35)
MCHC RBC AUTO-ENTMCNC: 34.6 G/DL (ref 32–34.5)
MCV RBC AUTO: 90.5 FL (ref 80–99.9)
MONOCYTES NFR BLD: 0.44 K/UL (ref 0.1–0.95)
MONOCYTES NFR BLD: 7 % (ref 2–12)
NEUTROPHILS NFR BLD: 56 % (ref 43–80)
NEUTS SEG NFR BLD: 3.41 K/UL (ref 1.8–7.3)
PLATELET # BLD AUTO: 251 K/UL (ref 130–450)
PMV BLD AUTO: 9.8 FL (ref 7–12)
RBC # BLD AUTO: 3.58 M/UL (ref 3.8–5.8)
WBC OTHER # BLD: 6 K/UL (ref 4.5–11.5)

## 2025-05-21 PROCEDURE — 6370000000 HC RX 637 (ALT 250 FOR IP): Performed by: NURSE PRACTITIONER

## 2025-05-21 PROCEDURE — 85025 COMPLETE CBC W/AUTO DIFF WBC: CPT

## 2025-05-21 PROCEDURE — 2500000003 HC RX 250 WO HCPCS: Performed by: PHYSICIAN ASSISTANT

## 2025-05-21 PROCEDURE — 2580000003 HC RX 258: Performed by: SPECIALIST

## 2025-05-21 PROCEDURE — 6360000002 HC RX W HCPCS: Performed by: SPECIALIST

## 2025-05-21 PROCEDURE — 82962 GLUCOSE BLOOD TEST: CPT

## 2025-05-21 PROCEDURE — 2060000000 HC ICU INTERMEDIATE R&B

## 2025-05-21 PROCEDURE — 6360000002 HC RX W HCPCS: Performed by: INTERNAL MEDICINE

## 2025-05-21 PROCEDURE — 83036 HEMOGLOBIN GLYCOSYLATED A1C: CPT

## 2025-05-21 PROCEDURE — 36415 COLL VENOUS BLD VENIPUNCTURE: CPT

## 2025-05-21 PROCEDURE — 6370000000 HC RX 637 (ALT 250 FOR IP): Performed by: INTERNAL MEDICINE

## 2025-05-21 PROCEDURE — 6360000002 HC RX W HCPCS: Performed by: PHYSICIAN ASSISTANT

## 2025-05-21 PROCEDURE — 6370000000 HC RX 637 (ALT 250 FOR IP): Performed by: PHYSICIAN ASSISTANT

## 2025-05-21 PROCEDURE — 2580000003 HC RX 258: Performed by: PHYSICIAN ASSISTANT

## 2025-05-21 RX ORDER — ENOXAPARIN SODIUM 100 MG/ML
40 INJECTION SUBCUTANEOUS DAILY
Status: DISCONTINUED | OUTPATIENT
Start: 2025-05-21 | End: 2025-05-23 | Stop reason: HOSPADM

## 2025-05-21 RX ORDER — M-VIT,TX,IRON,MINS/CALC/FOLIC 27MG-0.4MG
1 TABLET ORAL DAILY
COMMUNITY

## 2025-05-21 RX ORDER — OXYCODONE HYDROCHLORIDE 5 MG/1
2.5 TABLET ORAL EVERY 4 HOURS PRN
Status: DISCONTINUED | OUTPATIENT
Start: 2025-05-21 | End: 2025-05-23 | Stop reason: HOSPADM

## 2025-05-21 RX ORDER — ASPIRIN 81 MG/1
81 TABLET, CHEWABLE ORAL DAILY
Status: DISCONTINUED | OUTPATIENT
Start: 2025-05-21 | End: 2025-05-23 | Stop reason: HOSPADM

## 2025-05-21 RX ORDER — IBUPROFEN 800 MG/1
800 TABLET, FILM COATED ORAL EVERY 8 HOURS PRN
COMMUNITY

## 2025-05-21 RX ORDER — SODIUM CHLORIDE 9 MG/ML
INJECTION, SOLUTION INTRAVENOUS PRN
Status: DISCONTINUED | OUTPATIENT
Start: 2025-05-21 | End: 2025-05-23 | Stop reason: HOSPADM

## 2025-05-21 RX ORDER — OXYCODONE HYDROCHLORIDE 5 MG/1
5 TABLET ORAL EVERY 4 HOURS PRN
Status: DISCONTINUED | OUTPATIENT
Start: 2025-05-21 | End: 2025-05-23 | Stop reason: HOSPADM

## 2025-05-21 RX ORDER — ACETAMINOPHEN 650 MG/1
650 SUPPOSITORY RECTAL EVERY 6 HOURS PRN
Status: DISCONTINUED | OUTPATIENT
Start: 2025-05-21 | End: 2025-05-21

## 2025-05-21 RX ORDER — LISINOPRIL 20 MG/1
20 TABLET ORAL 2 TIMES DAILY
COMMUNITY

## 2025-05-21 RX ORDER — INSULIN LISPRO 100 [IU]/ML
0-4 INJECTION, SOLUTION INTRAVENOUS; SUBCUTANEOUS
Status: DISCONTINUED | OUTPATIENT
Start: 2025-05-21 | End: 2025-05-23 | Stop reason: HOSPADM

## 2025-05-21 RX ORDER — GLIPIZIDE 5 MG/1
5 TABLET ORAL
COMMUNITY

## 2025-05-21 RX ORDER — ONDANSETRON 2 MG/ML
4 INJECTION INTRAMUSCULAR; INTRAVENOUS EVERY 6 HOURS PRN
Status: DISCONTINUED | OUTPATIENT
Start: 2025-05-21 | End: 2025-05-23 | Stop reason: HOSPADM

## 2025-05-21 RX ORDER — CARVEDILOL 25 MG/1
25 TABLET ORAL 2 TIMES DAILY WITH MEALS
COMMUNITY

## 2025-05-21 RX ORDER — ACETAMINOPHEN 325 MG/1
650 TABLET ORAL EVERY 6 HOURS PRN
Status: DISCONTINUED | OUTPATIENT
Start: 2025-05-21 | End: 2025-05-21

## 2025-05-21 RX ORDER — PROMETHAZINE HYDROCHLORIDE 12.5 MG/1
12.5 TABLET ORAL EVERY 6 HOURS PRN
Status: DISCONTINUED | OUTPATIENT
Start: 2025-05-21 | End: 2025-05-23 | Stop reason: HOSPADM

## 2025-05-21 RX ORDER — PANTOPRAZOLE SODIUM 40 MG/1
40 TABLET, DELAYED RELEASE ORAL DAILY
COMMUNITY

## 2025-05-21 RX ORDER — SODIUM CHLORIDE 0.9 % (FLUSH) 0.9 %
5-40 SYRINGE (ML) INJECTION EVERY 12 HOURS SCHEDULED
Status: DISCONTINUED | OUTPATIENT
Start: 2025-05-21 | End: 2025-05-23 | Stop reason: HOSPADM

## 2025-05-21 RX ORDER — SODIUM CHLORIDE 0.9 % (FLUSH) 0.9 %
5-40 SYRINGE (ML) INJECTION PRN
Status: DISCONTINUED | OUTPATIENT
Start: 2025-05-21 | End: 2025-05-23 | Stop reason: HOSPADM

## 2025-05-21 RX ORDER — POLYETHYLENE GLYCOL 3350 17 G/17G
17 POWDER, FOR SOLUTION ORAL DAILY PRN
Status: DISCONTINUED | OUTPATIENT
Start: 2025-05-21 | End: 2025-05-23 | Stop reason: HOSPADM

## 2025-05-21 RX ORDER — ALLOPURINOL 300 MG/1
300 TABLET ORAL DAILY
COMMUNITY

## 2025-05-21 RX ADMIN — SODIUM CHLORIDE, PRESERVATIVE FREE 10 ML: 5 INJECTION INTRAVENOUS at 20:40

## 2025-05-21 RX ADMIN — OXYCODONE HYDROCHLORIDE 5 MG: 5 TABLET ORAL at 00:39

## 2025-05-21 RX ADMIN — DOXYCYCLINE 100 MG: 100 INJECTION, POWDER, LYOPHILIZED, FOR SOLUTION INTRAVENOUS at 12:29

## 2025-05-21 RX ADMIN — SODIUM CHLORIDE 1000 MG: 9 INJECTION INTRAMUSCULAR; INTRAVENOUS; SUBCUTANEOUS at 20:40

## 2025-05-21 RX ADMIN — ASPIRIN 81 MG CHEWABLE TABLET 81 MG: 81 TABLET CHEWABLE at 12:23

## 2025-05-21 RX ADMIN — OXYCODONE 5 MG: 5 TABLET ORAL at 07:11

## 2025-05-21 RX ADMIN — SODIUM CHLORIDE, PRESERVATIVE FREE 10 ML: 5 INJECTION INTRAVENOUS at 12:25

## 2025-05-21 RX ADMIN — HYDROMORPHONE HYDROCHLORIDE 0.25 MG: 1 INJECTION, SOLUTION INTRAMUSCULAR; INTRAVENOUS; SUBCUTANEOUS at 13:34

## 2025-05-21 RX ADMIN — INSULIN LISPRO 1 UNITS: 100 INJECTION, SOLUTION INTRAVENOUS; SUBCUTANEOUS at 20:40

## 2025-05-21 ASSESSMENT — PAIN DESCRIPTION - ORIENTATION
ORIENTATION: LEFT;MID
ORIENTATION: LEFT;UPPER
ORIENTATION: LEFT
ORIENTATION: LEFT;MID

## 2025-05-21 ASSESSMENT — PAIN DESCRIPTION - LOCATION
LOCATION: JAW
LOCATION: OTHER (COMMENT)
LOCATION: CHEST
LOCATION: MOUTH

## 2025-05-21 ASSESSMENT — PAIN SCALES - GENERAL
PAINLEVEL_OUTOF10: 7
PAINLEVEL_OUTOF10: 3
PAINLEVEL_OUTOF10: 1

## 2025-05-21 ASSESSMENT — PAIN DESCRIPTION - FREQUENCY: FREQUENCY: CONTINUOUS

## 2025-05-21 ASSESSMENT — PAIN DESCRIPTION - PAIN TYPE: TYPE: ACUTE PAIN

## 2025-05-21 ASSESSMENT — PAIN - FUNCTIONAL ASSESSMENT: PAIN_FUNCTIONAL_ASSESSMENT: 0-10

## 2025-05-21 ASSESSMENT — PAIN DESCRIPTION - DESCRIPTORS
DESCRIPTORS: ACHING
DESCRIPTORS: ACHING
DESCRIPTORS: SORE;DULL;DISCOMFORT
DESCRIPTORS: DULL;ACHING;SORE

## 2025-05-21 NOTE — PROGRESS NOTES
Pt states dr cabrera Rx Abx for dental procedure day prior and was questioning if he should follow up with the medication today. Called Eunice DO no answer at this time will follow up in 10 minutes.

## 2025-05-21 NOTE — CONSULTS
[facility]  [facility]    Consultation Note     Admit Date: 5/20/2025  7:59 PM    Reason for Consult:       Attending Physician:  Frankie Dawson DO     Chief Complaint:      HISTORY OF PRESENT ILLNESS:   The patient is a 75 y.o. man not known to the Infectious Diseases service. The patient is admitted through the emergency room because of status post removal of dental implants that were extracted today #9 through 15.  He apparently was having significant amount of pain and was seen by his dentist who had him on oral doxycycline.  After Dr. Apple extracted and dentures placed on clindamycin and recently was seen in [facility] emergency room.  Since the patient has multiple allergies to antibiotics he is being admitted for IV therapy for possible osteomyelitis but for sure he has periodontitis and gingivitis..  White count in the emergency room was 6.0 with a hemoglobin 11.2 and BUN and creatinine of 23 and 1.3.  CT scan facial bones was not diagnostic for osteomyelitis but reported carious teeth noted and blood cultures are pending from 5/19/2025 per previous blood cultures from 5/13/2025 are negative as is the urine.                         Past Medical History:    Hypertension  Carious dentition  Diabetes  Past Surgical History:    Dental implant and extraction of the same  Lipoma extracted on 5/1/2019  EGD with duodenal biopsy and stomach biopsy  Current Medications:   Scheduled Meds:   sodium chloride flush  5-40 mL IntraVENous 2 times per day    enoxaparin  40 mg SubCUTAneous Daily    aspirin  81 mg Oral Daily    doxycycline (VIBRAMYCIN) IV  100 mg IntraVENous Q12H    insulin lispro  0-4 Units SubCUTAneous 4x Daily AC & HS     Continuous Infusions:   sodium chloride       PRN Meds:sodium chloride flush, sodium chloride, promethazine **OR** ondansetron, polyethylene glycol, oxyCODONE, oxyCODONE, HYDROmorphone    Allergies:  Pcn [penicillins], Ciprofloxacin,  and Sulfa antibiotics    Social History:   Social History     Socioeconomic History    Marital status:      Spouse name: None    Number of children: None    Years of education: None    Highest education level: None   Tobacco Use    Smoking status: Never    Smokeless tobacco: Never   Substance and Sexual Activity    Alcohol use: Never    Drug use: Never       Family History:   History reviewed. No pertinent family history.. Otherwise non-pertinent to the chief complaint.    REVIEW OF SYSTEMS:    CONSTITUTIONAL: Positive chills, negative fevers or night sweats. No loss of weight.  EYES:  No double vision or drainage from eyes, ears or throat.  HEENT:  No neck stiffness. No dysphagia. No drainage from eyes, ears or throat; see history of present illness with reference to the oropharynx  RESPIRATORY:  No cough, productive sputum or hemoptysis.   CARDIOVASCULAR:  No chest pain, palpitations, orthopnea or dyspnea on exertion.  GASTROINTESTINAL:  No nausea, vomiting, diarrhea or constipation or hematochezia   GENITOURINARY:  No frequency burning dysuria or hematuria.  INTEGUMENT/BREAST:  No rash or breast masses.  HEMATOLOGIC/LYMPHATIC:  No lymphadenopathy or blood dyscrasics.   ALLERGIC/IMMUNOLOGIC:  No anaphylaxis.   ENDOCRINE:  No polyuria or polydipsia or temperature intolerance.    MUSCULOSKELETAL:  No myalgia or arthralgia.  Full ROM.  NEUROLOGICAL:  No focal motor sensory deficit.  BEHAVIOR/PSYCH:  No psychosis.     PHYSICAL EXAM:    Vitals:    BP (!) 168/87   Pulse 66   Temp 98.1 °F (36.7 °C) (Temporal)   Resp 16   SpO2 98%   Constitutional: The patient is awake, alert, and oriented.   Skin: Warm and dry. No rashes were noted. No jaundice.  HEENT: Eyes show round, and reactive pupils. Moist mucous membranes, no ulcerations, no thrush.  Extraction sites noted and the irritated mucosa also seen  Neck: Supple to movements. No lymphadenopathy.   Chest: No use of accessory muscles to breathe. Symmetrical

## 2025-05-21 NOTE — ED PROVIDER NOTES
ED physician  HPI:  5/20/25, Time: 8:40 PM EDT         Bob Nash is a 75 y.o. male presenting to the ED for admission to the hospital for IV antibiotics related to dental implants.  Patient was actually seen yesterday at [place] emergency department for a 3-week history of left upper dental and mouth pain.  Patient had several teeth pulled and implants were placed by his dentist and he was initially started on clindamycin.  Patient has had persistent pain and swelling and his dentist then put him on oral doxycycline.  He was referred to Dr. Apple for a second opinion.  Dr. Apple did suggest at that time for dental extraction and at a later date they would talk about dentures versus implants.  He actually did have labs completed yesterday as well as imaging and provider did speak with Dr. Ibarra as well as Dr. Apple called and spoke with the patient and it was decided that patient could be safely discharged home and he would see him first thing in the morning.  Patient did see Dr. Apple today in the office and did have extractions of the dental implants 9 through 15.  Patient does have multiple antibiotic allergies and with concerns for outpatient failure patient was sent in for IV antibiotics.  Patient also did report to me that he has been having intermittent complaints of midsternal chest pain.  He states that its most likely related to the stress.  He reports some mild shortness of breath.  The pain does not radiate up into his neck or jaw.  Patient does have a history of hypertension.  He is a non-smoker.  No recent long travel.  Patient does complain of mouth pain.  He denies any unusual vomiting or diarrhea as well as no gross facial swelling or change noted in voice quality or any throat tightening or drooling.  Review of Systems:   A complete review of systems was performed and pertinent positives and negatives are stated within HPI, all other systems reviewed and are  infectious disease and/or medicine team can call him for full update on his procedure as well as any additional information required for care.       Counseling:  History from : Patient and Medical records     Limitations to history : None    Chronic Conditions: Hypertension, diabetes    CONSULTS: PCP, oral surgery    Discussion with Other Profesionals : None    Social Determinants : None    Records Reviewed : Source patient and Inpatient Notes epic medical records        Disposition Considerations (Tests not ordered but considered, Shared Decision Making, Pt Expectation of Test or Tx.):   Appropriate for outpatient management yes and Evaluation by myself and discharge recommended.      I am the Primary Clinician of Record.     The emergency provider has spoken with the patient and discussed today’s results, in addition to providing specific details for the plan of care and counseling regarding the diagnosis and prognosis.  Questions are answered at this time and they are agreeable with the plan.      --------------------------------- IMPRESSION AND DISPOSITION ---------------------------------    IMPRESSION  1. Failure of dental implant due to periodontal infection    2. Pain, dental    3. Chest pain, unspecified type        DISPOSITION  Disposition: Admit to telemetry  Patient condition is stable      NOTE: This report was transcribed using voice recognition software. Every effort was made to ensure accuracy; however, inadvertent computerized transcription errors may be present     ATTENDING PROVIDER ATTESTATION:     I have personally performed and/or participated in the history, exam, medical decision making, and procedures and agree with all pertinent clinical information.      I have also reviewed and agree with the past medical, family and social history unless otherwise noted.    My findings/Plan: Patient was seen conjunction with nurse practitioner.  Patient with history of neck pain arthritis history of  Patient will be admitted to monitored bed for further evaluation and treatment.           Dion Butterfield MD  05/20/25 3169

## 2025-05-21 NOTE — H&P
Hospital Medicine History & Physical      PCP: Allen Acosta MD    Date of Admission: 5/20/2025    Date of Service:  MAY 21, 2025    Chief Complaint:   INFECTION AFTER DENTAL IMPLANT      History Of Present Illness:     75 y.o. male presented with INFECTION AFTER DENTAL IMPLANT.  STATES HE  HAD A DENTAL IMPLANT SEVERAL WEEKS AGO, HE BEGAN TO HAVE DRAINAGE , BLEEDING AND CHILLS.       Past Medical History:      History reviewed. No pertinent past medical history.    Past Surgical History:      History reviewed. No pertinent surgical history.    Medications Prior to Admission:      Prior to Admission medications    Medication Sig Start Date End Date Taking? Authorizing Provider   Multiple Vitamins-Minerals (THERAPEUTIC MULTIVITAMIN-MINERALS) tablet Take 1 tablet by mouth daily    Olegario Rogers MD   allopurinol (ZYLOPRIM) 300 MG tablet Take 1 tablet by mouth daily    Olegario Rogers MD   carvedilol (COREG) 25 MG tablet Take 1 tablet by mouth 2 times daily (with meals)    Olegario Rogers MD   glipiZIDE (GLUCOTROL) 5 MG tablet Take 1 tablet by mouth 2 times daily (before meals)    Olegario Rogers MD   lisinopril (PRINIVIL;ZESTRIL) 20 MG tablet Take 1 tablet by mouth in the morning and 1 tablet in the evening.    Olegario Rogers MD   metFORMIN (GLUCOPHAGE) 1000 MG tablet Take 1 tablet by mouth 2 times daily (with meals)    Olegario Rogers MD   pantoprazole (PROTONIX) 40 MG tablet Take 1 tablet by mouth daily    Olegario Rogers MD   ibuprofen (ADVIL;MOTRIN) 800 MG tablet Take 1 tablet by mouth every 8 hours as needed for Pain    Olegario Rogers MD   HYDROcodone-acetaminophen (NORCO) 5-325 MG per tablet Take 1 tablet by mouth every 6 hours as needed for Pain for up to 3 days. Intended supply: 3 days. Take lowest dose possible to manage pain Max Daily  Amount: 4 tablets 5/19/25 5/22/25  Lorraine Berman PA-C       Allergies:  Pcn [penicillins], Ciprofloxacin, and Sulfa antibiotics    Social History:      The patient currently lives WIFE    TOBACCO:   reports that he has never smoked. He has never used smokeless tobacco.  ETOH:   reports no history of alcohol use.      Family History:       Reviewed in detail and negative for DM, CAD, Cancer, CVA. Positive as follows:    History reviewed. No pertinent family history.    REVIEW OF SYSTEMS:   Pertinent positives as noted in the HPI. All other systems reviewed and negative.    PHYSICAL EXAM:    BP (!) 168/76   Pulse 60   Temp 98 °F (36.7 °C)   Resp 18   SpO2 97%     General appearance:  No apparent distress, appears stated age.  HEENT:  Normal cephalic, atraumatic without obvious deformity. Pupils equal, round, and reactive to light.  Extra ocular muscles intact. Conjunctivae/corneas clear. LEFT SIDE OF FACE IS EDEMATOUS   Neck: Supple, with full range of motion. No jugular venous distention. Trachea midline.  Respiratory:  Normal respiratory effort. Clear to auscultation,   Cardiovascular:  RRR  Abdomen: Soft, non-tender, non-distended with normal bowel sounds.  Musculoskeletal:  No clubbing, cyanosis or edema bilaterally.    Skin: smooth and dry   Neurologic:   Cranial nerves: II-XII intact,   Psychiatric:  Alert and oriented x 3        Labs:     Recent Labs     05/19/25 1906 05/20/25 2041 05/21/25  1158   WBC 8.3 7.9 6.0   HGB 12.6 12.8 11.2*   HCT 37.7 37.7 32.4*    315 251     Recent Labs     05/19/25 1906 05/20/25 2041    140   K 4.4 4.6    104   CO2 21* 22   BUN 21 23   CREATININE 1.3* 1.3*   CALCIUM 10.2 10.2     Recent Labs     05/20/25 2041   AST 20   ALT 14   BILITOT 0.3   ALKPHOS 84     No results for input(s): \"INR\" in the last 72 hours.  No results for input(s): \"CKTOTAL\", \"TROPONINI\" in the last 72 hours.    Urinalysis:      Lab Results   Component Value Date/Time    NITRU

## 2025-05-22 ENCOUNTER — RESULTS FOLLOW-UP (OUTPATIENT)
Dept: PHARMACY | Age: 75
End: 2025-05-22

## 2025-05-22 LAB
ANION GAP SERPL CALCULATED.3IONS-SCNC: 13 MMOL/L (ref 7–16)
BASOPHILS # BLD: 0.03 K/UL (ref 0–0.2)
BASOPHILS NFR BLD: 1 % (ref 0–2)
BUN SERPL-MCNC: 14 MG/DL (ref 8–23)
CALCIUM SERPL-MCNC: 9.4 MG/DL (ref 8.8–10.2)
CHLORIDE SERPL-SCNC: 106 MMOL/L (ref 98–107)
CO2 SERPL-SCNC: 22 MMOL/L (ref 22–29)
CREAT SERPL-MCNC: 1 MG/DL (ref 0.7–1.2)
CRP SERPL HS-MCNC: 3.3 MG/L (ref 0–5)
EKG ATRIAL RATE: 74 BPM
EKG P AXIS: 70 DEGREES
EKG P-R INTERVAL: 258 MS
EKG Q-T INTERVAL: 334 MS
EKG QRS DURATION: 92 MS
EKG QTC CALCULATION (BAZETT): 370 MS
EKG R AXIS: 16 DEGREES
EKG T AXIS: 34 DEGREES
EKG VENTRICULAR RATE: 74 BPM
EOSINOPHIL # BLD: 0.14 K/UL (ref 0.05–0.5)
EOSINOPHILS RELATIVE PERCENT: 2 % (ref 0–6)
ERYTHROCYTE [DISTWIDTH] IN BLOOD BY AUTOMATED COUNT: 12.5 % (ref 11.5–15)
ERYTHROCYTE [SEDIMENTATION RATE] IN BLOOD BY WESTERGREN METHOD: 45 MM/HR (ref 0–15)
GFR, ESTIMATED: 74 ML/MIN/1.73M2
GLUCOSE BLD-MCNC: 168 MG/DL (ref 74–99)
GLUCOSE BLD-MCNC: 185 MG/DL (ref 74–99)
GLUCOSE BLD-MCNC: 235 MG/DL (ref 74–99)
GLUCOSE SERPL-MCNC: 129 MG/DL (ref 74–99)
HCT VFR BLD AUTO: 31.7 % (ref 37–54)
HGB BLD-MCNC: 10.8 G/DL (ref 12.5–16.5)
IMM GRANULOCYTES # BLD AUTO: <0.03 K/UL (ref 0–0.58)
IMM GRANULOCYTES NFR BLD: 0 % (ref 0–5)
INR PPP: 1.1
LYMPHOCYTES NFR BLD: 2.26 K/UL (ref 1.5–4)
LYMPHOCYTES RELATIVE PERCENT: 37 % (ref 20–42)
MCH RBC QN AUTO: 31.2 PG (ref 26–35)
MCHC RBC AUTO-ENTMCNC: 34.1 G/DL (ref 32–34.5)
MCV RBC AUTO: 91.6 FL (ref 80–99.9)
MICROORGANISM SPEC CULT: ABNORMAL
MICROORGANISM SPEC CULT: NORMAL
MICROORGANISM SPEC CULT: NORMAL
MICROORGANISM/AGENT SPEC: ABNORMAL
MONOCYTES NFR BLD: 0.42 K/UL (ref 0.1–0.95)
MONOCYTES NFR BLD: 7 % (ref 2–12)
NEUTROPHILS NFR BLD: 53 % (ref 43–80)
NEUTS SEG NFR BLD: 3.21 K/UL (ref 1.8–7.3)
PARTIAL THROMBOPLASTIN TIME: 33.8 SEC (ref 24.5–35.1)
PLATELET # BLD AUTO: 245 K/UL (ref 130–450)
PMV BLD AUTO: 10 FL (ref 7–12)
POTASSIUM SERPL-SCNC: 4.1 MMOL/L (ref 3.5–5.1)
PROCALCITONIN SERPL-MCNC: 0.04 NG/ML (ref 0–0.08)
PROTHROMBIN TIME: 12.6 SEC (ref 9.3–12.4)
RBC # BLD AUTO: 3.46 M/UL (ref 3.8–5.8)
SODIUM SERPL-SCNC: 141 MMOL/L (ref 136–145)
SPECIMEN DESCRIPTION: ABNORMAL
SPECIMEN DESCRIPTION: NORMAL
SPECIMEN DESCRIPTION: NORMAL
WBC OTHER # BLD: 6.1 K/UL (ref 4.5–11.5)

## 2025-05-22 PROCEDURE — 2580000003 HC RX 258: Performed by: SPECIALIST

## 2025-05-22 PROCEDURE — 84145 PROCALCITONIN (PCT): CPT

## 2025-05-22 PROCEDURE — 86140 C-REACTIVE PROTEIN: CPT

## 2025-05-22 PROCEDURE — 6370000000 HC RX 637 (ALT 250 FOR IP): Performed by: PHYSICIAN ASSISTANT

## 2025-05-22 PROCEDURE — 6370000000 HC RX 637 (ALT 250 FOR IP): Performed by: INTERNAL MEDICINE

## 2025-05-22 PROCEDURE — 87040 BLOOD CULTURE FOR BACTERIA: CPT

## 2025-05-22 PROCEDURE — 2500000003 HC RX 250 WO HCPCS: Performed by: SPECIALIST

## 2025-05-22 PROCEDURE — 2060000000 HC ICU INTERMEDIATE R&B

## 2025-05-22 PROCEDURE — 85652 RBC SED RATE AUTOMATED: CPT

## 2025-05-22 PROCEDURE — 6360000002 HC RX W HCPCS: Performed by: SPECIALIST

## 2025-05-22 PROCEDURE — 82962 GLUCOSE BLOOD TEST: CPT

## 2025-05-22 PROCEDURE — 97161 PT EVAL LOW COMPLEX 20 MIN: CPT

## 2025-05-22 PROCEDURE — 2500000003 HC RX 250 WO HCPCS: Performed by: PHYSICIAN ASSISTANT

## 2025-05-22 PROCEDURE — 85025 COMPLETE CBC W/AUTO DIFF WBC: CPT

## 2025-05-22 PROCEDURE — 6370000000 HC RX 637 (ALT 250 FOR IP): Performed by: NURSE PRACTITIONER

## 2025-05-22 PROCEDURE — 6360000002 HC RX W HCPCS: Performed by: PHYSICIAN ASSISTANT

## 2025-05-22 PROCEDURE — 85610 PROTHROMBIN TIME: CPT

## 2025-05-22 PROCEDURE — 80048 BASIC METABOLIC PNL TOTAL CA: CPT

## 2025-05-22 PROCEDURE — 36415 COLL VENOUS BLD VENIPUNCTURE: CPT

## 2025-05-22 PROCEDURE — 93010 ELECTROCARDIOGRAM REPORT: CPT | Performed by: INTERNAL MEDICINE

## 2025-05-22 PROCEDURE — 85730 THROMBOPLASTIN TIME PARTIAL: CPT

## 2025-05-22 RX ORDER — M-VIT,TX,IRON,MINS/CALC/FOLIC 27MG-0.4MG
1 TABLET ORAL DAILY
Status: DISCONTINUED | OUTPATIENT
Start: 2025-05-23 | End: 2025-05-23 | Stop reason: HOSPADM

## 2025-05-22 RX ORDER — CARVEDILOL 25 MG/1
25 TABLET ORAL 2 TIMES DAILY WITH MEALS
Status: DISCONTINUED | OUTPATIENT
Start: 2025-05-22 | End: 2025-05-23 | Stop reason: HOSPADM

## 2025-05-22 RX ORDER — SODIUM CHLORIDE 0.9 % (FLUSH) 0.9 %
5-40 SYRINGE (ML) INJECTION PRN
Status: DISCONTINUED | OUTPATIENT
Start: 2025-05-22 | End: 2025-05-23 | Stop reason: HOSPADM

## 2025-05-22 RX ORDER — LISINOPRIL 20 MG/1
20 TABLET ORAL 2 TIMES DAILY
Status: DISCONTINUED | OUTPATIENT
Start: 2025-05-22 | End: 2025-05-23 | Stop reason: HOSPADM

## 2025-05-22 RX ORDER — LIDOCAINE HYDROCHLORIDE 10 MG/ML
5 INJECTION, SOLUTION EPIDURAL; INFILTRATION; INTRACAUDAL; PERINEURAL ONCE
Status: DISCONTINUED | OUTPATIENT
Start: 2025-05-22 | End: 2025-05-23 | Stop reason: HOSPADM

## 2025-05-22 RX ORDER — ALLOPURINOL 300 MG/1
300 TABLET ORAL DAILY
Status: DISCONTINUED | OUTPATIENT
Start: 2025-05-22 | End: 2025-05-23 | Stop reason: HOSPADM

## 2025-05-22 RX ORDER — PANTOPRAZOLE SODIUM 40 MG/1
40 TABLET, DELAYED RELEASE ORAL
Status: DISCONTINUED | OUTPATIENT
Start: 2025-05-23 | End: 2025-05-23 | Stop reason: HOSPADM

## 2025-05-22 RX ORDER — SODIUM CHLORIDE 0.9 % (FLUSH) 0.9 %
5-40 SYRINGE (ML) INJECTION EVERY 12 HOURS SCHEDULED
Status: DISCONTINUED | OUTPATIENT
Start: 2025-05-22 | End: 2025-05-23 | Stop reason: HOSPADM

## 2025-05-22 RX ORDER — SODIUM CHLORIDE 9 MG/ML
25 INJECTION, SOLUTION INTRAVENOUS PRN
Status: DISCONTINUED | OUTPATIENT
Start: 2025-05-22 | End: 2025-05-23 | Stop reason: HOSPADM

## 2025-05-22 RX ADMIN — SODIUM CHLORIDE 1000 MG: 9 INJECTION INTRAMUSCULAR; INTRAVENOUS; SUBCUTANEOUS at 18:39

## 2025-05-22 RX ADMIN — INSULIN LISPRO 1 UNITS: 100 INJECTION, SOLUTION INTRAVENOUS; SUBCUTANEOUS at 20:16

## 2025-05-22 RX ADMIN — ENOXAPARIN SODIUM 40 MG: 100 INJECTION SUBCUTANEOUS at 10:38

## 2025-05-22 RX ADMIN — SODIUM CHLORIDE, PRESERVATIVE FREE 10 ML: 5 INJECTION INTRAVENOUS at 20:16

## 2025-05-22 RX ADMIN — SODIUM CHLORIDE, PRESERVATIVE FREE 10 ML: 5 INJECTION INTRAVENOUS at 10:38

## 2025-05-22 RX ADMIN — LISINOPRIL 20 MG: 20 TABLET ORAL at 20:16

## 2025-05-22 RX ADMIN — CARVEDILOL 25 MG: 25 TABLET, FILM COATED ORAL at 20:16

## 2025-05-22 RX ADMIN — ASPIRIN 81 MG CHEWABLE TABLET 81 MG: 81 TABLET CHEWABLE at 10:38

## 2025-05-22 RX ADMIN — ALLOPURINOL 300 MG: 300 TABLET ORAL at 20:16

## 2025-05-22 RX ADMIN — OXYCODONE 5 MG: 5 TABLET ORAL at 01:30

## 2025-05-22 ASSESSMENT — PAIN SCALES - GENERAL
PAINLEVEL_OUTOF10: 0
PAINLEVEL_OUTOF10: 1
PAINLEVEL_OUTOF10: 0
PAINLEVEL_OUTOF10: 5
PAINLEVEL_OUTOF10: 0

## 2025-05-22 ASSESSMENT — PAIN DESCRIPTION - LOCATION: LOCATION: MOUTH

## 2025-05-22 NOTE — CARE COORDINATION
Transition of Care: Met with patient at bedside and explained case management role. Patient lives in a Ranch Style home with no steps to enter. No Hx SNF, HHC or DME. Patient primary care physician is Allen Acosta MD. Patient uses [facility] pharmacy in [place].  Primary decision maker is Billie (wife). Discharge plan is Home with Home healthcare for possible IV antibiotics. Referrals to [facility], [facility], [facility], [facility], and [facility] through [facility]. [facility] can take patient. Just need to confirm they can help with IV antibiotics. Also, asked what pharmacy they use. Patient came to hospital after having his dental implants removed with concern for osteomyelitis. IV Invanz Q 24. CM/SW to follow. MT    Case Management Assessment  Initial Evaluation    Date/Time of Evaluation: 5/22/2025 10:13 AM  Assessment Completed by: Allen Mazariegos RN    If patient is discharged prior to next notation, then this note serves as note for discharge by case management.    Patient Name: Bob Nash                   YOB: 1950  Diagnosis: Dental abscess [K04.7]  Failure of dental implant due to periodontal infection [M27.62]  Pain, dental [K08.89]  Chest pain, unspecified type [R07.9]                   Date / Time: 5/20/2025  7:59 PM    Patient Admission Status: Inpatient   Readmission Risk (Low < 19, Mod (19-27), High > 27): Readmission Risk Score: 9.5    Current PCP: Allen Acosta MD  PCP verified by ? Yes    Chart Reviewed: Yes      History Provided by: Patient  Patient Orientation: Alert and Oriented    Patient Cognition: Alert    Hospitalization in the last 30 days (Readmission):  No    If yes, Readmission Assessment in  Navigator will be completed.    Advance Directives:      Code Status: Full Code   Patient's Primary Decision Maker is: Legal Next of Kin      Discharge Planning:    Patient lives with: Spouse/Significant Other Type of Home: Other (Comment) (Condo)  Primary Care  shares the quality data associated with the providers was provided to:     Patient Representative Name:       The Patient and/or Patient Representative Agree with the Discharge Plan?      Allen Mazariegos RN BSN  Case Management  [phone]

## 2025-05-22 NOTE — PROGRESS NOTES
Physical Therapy  Physical Therapy Initial Assessment     Name: Bob Nash  : 1950  MRN: [id number]    Date of Service: 2025    Evaluating PT:  Yolanda Bailey PT, DPT [id number]    Room #:  4502/4502-B  Diagnosis:  Dental abscess [K04.7]  Failure of dental implant due to periodontal infection [M27.62]  Pain, dental [K08.89]  Chest pain, unspecified type [R07.9]  PMHx/PSHx:  History reviewed. No pertinent past medical history.   Procedure/Surgery:  none this admission  Precautions:  none  Equipment Needs:  none    SUBJECTIVE:    Pt lives with wife in a 1 story home with level entry.  Pt ambulated with no AD PTA. Pt reports participating in regular exercise PTA.     OBJECTIVE:   Initial Evaluation  Date: 25 Treatment Short Term/ Long Term   Goals   AM-PAC 6 Clicks      Was pt agreeable to Eval/treatment? yes     Does pt have pain? No c/o pain     Bed Mobility  Rolling: NT  Supine to sit: NT  Sit to supine: NT  Scooting: NT  NA   Transfers Sit to stand: Independent  Stand to sit: Independent  Stand pivot: Independent  NA   Ambulation    400 feet with no AD Independent  NA   Stair negotiation: ascended and descended  NT, pt reports no concerns with mobility  NA   ROM BUE:  defer to OT  BLE:  WNL     Strength BUE:  defer to OT  BLE:  WNL     Balance Sitting EOB:  Independent   Dynamic Standing:  Independent   NA     Pt is A & O x 4  Sensation:  Pt denies numbness and tingling to extremities  Edema:  none noted    Patient education  Pt educated on role of PT    Patient response to education:   Pt verbalized understanding Pt demonstrated skill Pt requires further education in this area   yes yes no     ASSESSMENT:    Conditions Requiring Skilled Therapeutic Intervention:  N/a  []Decreased strength     []Decreased ROM  []Decreased functional mobility  []Decreased balance   []Decreased endurance   []Decreased posture  []Decreased sensation  []Decreased coordination   []Decreased  and/or into the community   [] Positioning to prevent skin breakdown and contractures  [] Safety and Education Training   [] Patient/Caregiver Education   [] HEP  [] Other     Based on pt's current level of functional independence for all mobility, this pt is not a candidate for continued skilled PT services. Will remove pt from PT caseload. Please re-consult if pt experiences functional decline. Thank you.    Time in: 1425  Time out: 1435    Total Treatment Time 0 minutes     Evaluation Time includes thorough review of current medical information, gathering information on past medical history/social history and prior level of function, completion of standardized testing/informal observation of tasks, assessment of data and education on plan of care and goals..    CPT codes:  [x] Low Complexity PT evaluation 31074  [] Moderate Complexity PT evaluation 02529  [] High Complexity PT evaluation 99639  [] PT Re-evaluation 26053  [] Gait training 33333 - minutes  [] Manual therapy 58128 - minutes  [] Therapeutic activities 85747 - minutes  [] Therapeutic exercises 03692 - minutes  [] Neuromuscular reeducation 50534 - minutes     Yolanda Bailey PT, DPT   [id number]

## 2025-05-22 NOTE — PROGRESS NOTES
4 Eyes Skin Assessment     NAME:  Bob Nash  YOB: 1950  MEDICAL RECORD NUMBER:  [id number]    The patient is being assessed for  Admission    I agree that at least one RN has performed a thorough Head to Toe Skin Assessment on the patient. ALL assessment sites listed below have been assessed.      Areas assessed by both nurses:    Head, Face, Ears, Shoulders, Back, Chest, Arms, Elbows, Hands, Sacrum. Buttock, Coccyx, Ischium, and Legs. Feet and Heels        Does the Patient have a Wound? No noted wound(s)       Francesco Prevention initiated by RN: No  Wound Care Orders initiated by RN: No    Pressure Injury (Stage 3,4, Unstageable, DTI, NWPT, and Complex wounds) if present, place Wound referral order by RN under : No    New Ostomies, if present place, Ostomy referral order under : No     Nurse 1 eSignature: Electronically signed by Vaishnavi Wright RN on 5/21/25 at 8:17 PM EDT    **SHARE this note so that the co-signing nurse can place an eSignature**    Nurse 2 eSignature: {Esignature:[id number]}

## 2025-05-22 NOTE — PROGRESS NOTES
[facility]                                Progress note    Subjective:    Patient unavailable during rounds  Chart reviewed and discussed case with charge    Objective:    BP (!) 158/86   Pulse 72   Temp 98.1 °F (36.7 °C)   Resp 17   Ht 1.803 m (5' 11\")   Wt 91.6 kg (202 lb)   SpO2 95%   BMI 28.17 kg/m²     In: 540 [P.O.:540]  Out: -   In: 540   Out: -          Recent Labs     05/20/25 2041 05/21/25  1158 05/22/25  0411   WBC 7.9 6.0 6.1   HGB 12.8 11.2* 10.8*   HCT 37.7 32.4* 31.7*    251 245       Recent Labs     05/19/25  1906 05/20/25 2041 05/22/25  0411    140 141   K 4.4 4.6 4.1    104 106   CO2 21* 22 22   BUN 21 23 14   CREATININE 1.3* 1.3* 1.0   CALCIUM 10.2 10.2 9.4       Assessment:    Principal Problem:    Failure of dental implant due to periodontal infection  Active Problems:    Other chest pain    Dental abscess  Resolved Problems:    * No resolved hospital problems. *      Plan:    Patient is a 75-year-old male admitted to [facility] for  Failure of dental implant due to periodontal infection  -Monitor labs  -Remains on IV Invanz per infectious disease  -PICC line ordered for ongoing antibiotics as an outpatient  -[facility] to start ABX therapy on Saturday, will receive last dose tomorrow and can be discharged thereafter  -Labs stable      Code status: Full  Consultants: ID and oral surgery    DVT Prophylaxis Lovenox  PT/OT  Discharge planning         KALEN Herrmann - CNP  2:02 PM  5/22/2025

## 2025-05-22 NOTE — PROGRESS NOTES
IV team notified of picc line order. Electronically signed by Karrie Jack RN on 5/22/2025 at 10:49 AM

## 2025-05-22 NOTE — DISCHARGE INSTRUCTIONS
[facility]  ([facility])  [address]  [address] [place] [address]  Phone [phone]   Fax [phone]        Chivo Perera MD, FACP, MD Abi Robertson MD Indra P. Limbu, MD Munir P. Shah, MD Andrea Dixon-Theisler, RN, CNS           STANDING ORDERS (“ID Protocol”)     Visiting nurses are to write the Primary Care Physician and their own call back number on all laboratory requisition forms.   Abnormal lab values are called to the physician by the nurse and NOT by the laboratory.   Fax all labs to the office in a timely manner, during office hours. All faxes should include nurse’s name and call back number.  Vascular Access Devices or VADs (TLC, PICC, Midline, etc) will be replaced as necessary.  Draw all blood work from VADs, except for drug levels.  If unable to access a VAD, insert a peripheral catheter temporarily. Contact the Primary Care Physician or [facility] office for surgical referral.  Use tPA (Cathflo®, Alteplase®) as per agency protocol to restore patency of VAD.  Remove VAD upon completion of IV antibiotics, unless otherwise specified by the ordering physician.  If VAD cannot be removed, schedule appointment at office for removal.  Notify ordering physician or office if patient requires admission to the hospital with reason for admission.  Discontinue all blood work upon completion of IV antibiotics, unless otherwise specified by ordering physician.  Notify ordering physician if the patient does not receive the scheduled antibiotic for 24 hours or more.  The Pharmacy and Home Health Agency may adjust the timing of the infusion and blood work to accommodate the patient’s home care conditions.  When PICC or VAD is to be removed, documentation of length of inserted PICC. PICC or VAD length is to correlate with inserted length and sent to physician at the time of removal.  Give the patient a list of

## 2025-05-22 NOTE — PROGRESS NOTES
[facility]  [facility]  Progress Note      Chief Complaint   Patient presents with    Dental Pain     Ongoing issue with dental implants. Pain for the past 3 weeks, stated his dentist told him to come to the ER to get IV antibiotics . Worried about a dental infection        SUBJECTIVE:  Patient is tolerating medications. No reported adverse drug reactions.  No nausea, vomiting, diarrhea.  Complains of decreased urine output; patient is getting OxyContin every 4 hours for pain  Awake alert at this time and tolerating end of    Review of systems:  As stated above in the chief complaint, otherwise negative.    Medications:  Scheduled Meds:   lidocaine 1 % injection  5 mL IntraDERmal Once    sodium chloride flush  5-40 mL IntraVENous 2 times per day    sodium chloride flush  5-40 mL IntraVENous 2 times per day    enoxaparin  40 mg SubCUTAneous Daily    aspirin  81 mg Oral Daily    insulin lispro  0-4 Units SubCUTAneous 4x Daily AC & HS    ertapenem (INVanz) IV  1,000 mg IntraVENous Q24H     Continuous Infusions:   sodium chloride      sodium chloride       PRN Meds:sodium chloride flush, sodium chloride, sodium chloride flush, sodium chloride, promethazine **OR** ondansetron, polyethylene glycol, oxyCODONE, oxyCODONE, HYDROmorphone    OBJECTIVE:  BP (!) 129/49   Pulse 60   Temp 97.8 °F (36.6 °C) (Temporal)   Resp 16   Ht 1.803 m (5' 11\")   Wt 91.6 kg (202 lb)   SpO2 96%   BMI 28.17 kg/m²   Temp  Av °F (36.7 °C)  Min: 97.8 °F (36.6 °C)  Max: 98.2 °F (36.8 °C)  Constitutional: The patient is awake, alert, and oriented.   Skin: Warm and dry. No rashes were noted.   HEENT: Round and reactive pupils.  Moist mucous membranes.  No ulcerations or thrush.  No drainage from the surgical site  Neck: Supple to movements.   Chest: No use of accessory muscles to breathe. Symmetrical expansion.  No wheezing, crackles or rhonchi.  Cardiovascular: S1 and S2 are rhythmic and regular. No murmurs  appreciated.   Abdomen: Positive bowel sounds to auscultation. Benign to palpation. No masses felt. No hepatosplenomegaly.  Genitourinary: Male  Extremities: No clubbing, no cyanosis, no edema.  Lines: peripheral    Laboratory and Tests Review:  Lab Results   Component Value Date    WBC 6.1 05/22/2025    WBC 6.0 05/21/2025    WBC 7.9 05/20/2025    HGB 10.8 (L) 05/22/2025    HCT 31.7 (L) 05/22/2025    MCV 91.6 05/22/2025     05/22/2025     Lab Results   Component Value Date    NEUTROABS 3.21 05/22/2025    NEUTROABS 3.41 05/21/2025    NEUTROABS 5.21 05/20/2025     No results found for: \"CRPHS\"  Lab Results   Component Value Date    ALT 14 05/20/2025    AST 20 05/20/2025    ALKPHOS 84 05/20/2025    BILITOT 0.3 05/20/2025     Lab Results   Component Value Date/Time     05/22/2025 04:11 AM    K 4.1 05/22/2025 04:11 AM     05/22/2025 04:11 AM    CO2 22 05/22/2025 04:11 AM    BUN 14 05/22/2025 04:11 AM    CREATININE 1.0 05/22/2025 04:11 AM    CREATININE 1.3 05/20/2025 08:41 PM    CREATININE 1.3 05/19/2025 07:06 PM    GFRAA >60 11/05/2018 11:01 AM    LABGLOM 74 05/22/2025 04:11 AM    GLUCOSE 129 05/22/2025 04:11 AM    CALCIUM 9.4 05/22/2025 04:11 AM    BILITOT 0.3 05/20/2025 08:41 PM    ALKPHOS 84 05/20/2025 08:41 PM    AST 20 05/20/2025 08:41 PM    ALT 14 05/20/2025 08:41 PM     Lab Results   Component Value Date    CRP 4.5 05/19/2025     No results found for: \"SEDRATE\"  Radiology:      Microbiology:   No results found for: \"BC\", \"ORG\"  No results found for: \"BLOODCULT2\", \"ORG\"  No results found for: \"WNDABS\"  No results found for: \"RESPSMEAR\"  No results found for: \"MPNEUMO\", \"CLAMYDCU\", \"LABLEGI\", \"AFBCX\", \"FUNGSM\", \"LABFUNG\"  No results found for: \"CULTRESP\"  No results found for: \"CXCATHTIP\"  No results found for: \"BFCS\"  No results found for: \"CXSURG\"  No results found for: \"LABURIN\"  No results found for: \"MRSAC\"    ASSESSMENT:  Osteomyelitis status post denture implantation failure  Discussed the  case with oral surgery today and they felt that the disease was down to the bone and debridement at was a performed at the office.  In addition one of the implant penetrated the sinus according to oral surgery.    PLAN:  Continue Invanz for 42 days  PICC line  Check final cultures  Monitor labs    Chivo Perera MD  10:24 AM  5/22/2025

## 2025-05-22 NOTE — PROGRESS NOTES
Oral & Maxillofacial Surgery Consultation    Patient's Name/Date of Birth: Bob Turnerd / 1950 (75 y.o.)/male    Date: May 22, 2025     HPI: Patient is a 75 year male known to me. Patient reported having dental implants placed approximately 3 weeks ago by his general dentist in [place], [place]. Since the timeof placement patient reported pain and swelling at the implant sites. Approximately one week ago the patient was referred to my practice for evaluation and treatment. At that time it was noted that there was some granulation tissue around the healing attachment of the dental implant at site #13. The healing abutment was noted to be loose and was tightened into place. Slight purulence was also noted around the implant. A CBCT was obtained in my office at that time and did not demonstrate any lytic lesions other than at a site where an implant had been removed. A washout of the soft tissue was performed in my office and the patient was placed on doxycycline 100mg BID. At regular follow up intervals the patient had reported improvement in his symptoms and was told to continue his antibiotics. This Monday 05/19/2025 the patient was seen for a follow up and was noted to have worsening of pain and symptoms. He alos reports that an entire implant was lost over the weekend. At this time I discussed with the patient that the best course of action would be removal of all implants in the area and thorough debridement. The patient had an appointment with his dentist later that afternoon and wanted to discuss with her. At that appointment she began to debride the site but found it to be more extensive than she was comfortable with. We discussed that the patient will need thorough surgical intervention and that I would do so, and that the patient may need evaluation by infectious disease team over concerns of possible osteomyelitis. The patient was instructed to follow up with me in my office 05/20/2025 for  17 g, Oral, Daily PRN, Christo Yeboah PA    aspirin chewable tablet 81 mg, 81 mg, Oral, Daily, Christo Yeboah PA, 81 mg at 05/21/25 1223    oxyCODONE (ROXICODONE) immediate release tablet 5 mg, 5 mg, Oral, Q4H PRN, Christo Yeboah PA, 5 mg at 05/22/25 0130    oxyCODONE (ROXICODONE) immediate release tablet 2.5 mg, 2.5 mg, Oral, Q4H PRN, Christo Yeboah PA    HYDROmorphone (DILAUDID) injection 0.25 mg, 0.25 mg, IntraVENous, Q4H PRN, Frankie Dawson DO, 0.25 mg at 05/21/25 1334    insulin lispro (HUMALOG,ADMELOG) injection vial 0-4 Units, 0-4 Units, SubCUTAneous, 4x Daily AC & HS, Frankie Dawson DO, 1 Units at 05/21/25 2040    ertapenem (INVanz) 1,000 mg in sodium chloride (PF) 0.9 % 10 mL IV syringe, 1,000 mg, IntraVENous, Q24H, Chivo Perera MD, 1,000 mg at 05/21/25 2040    Pcn [penicillins], Ciprofloxacin, and Sulfa antibiotics    No relevant family history has been documented for this patient.     Social History       Tobacco History       Smoking Status  Never      Smokeless Tobacco Use  Never              Alcohol History       Alcohol Use Status  Never              Drug Use       Drug Use Status  Never              Sexual Activity       Sexually Active  Not Asked                     Physical Exam:  Vitals:    05/21/25 2343   BP: (!) 129/49   Pulse: 60   Resp: 16   Temp: 97.8 °F (36.6 °C)   SpO2: 96%     Wt Readings from Last 3 Encounters:   05/21/25 91.6 kg (202 lb)   05/19/25 91.6 kg (202 lb)   05/08/25 91.6 kg (202 lb)         General: NAD, AA)x4  H: Normocephalic, atraumatic, no contusion or laceration, denies paresthesia  E: EOMI, PEERL, vision intact OU  E: EAC clear, no otorrhea  N: Nares patent, no epistaxis  M/T: Oral and pharyngeal mucosa pink and moist,  KORIN approximately 35mm, no purulence, sutures intact, minimal edema and ecchymosis  Neck: Nontender, no masses, trachea midline                Assessment/Plan: Satisfactory post-op course.    - Continue ID recs  - Appreciate

## 2025-05-23 VITALS
DIASTOLIC BLOOD PRESSURE: 70 MMHG | HEIGHT: 71 IN | SYSTOLIC BLOOD PRESSURE: 140 MMHG | TEMPERATURE: 97.5 F | OXYGEN SATURATION: 96 % | HEART RATE: 64 BPM | RESPIRATION RATE: 16 BRPM | WEIGHT: 202 LBS | BODY MASS INDEX: 28.28 KG/M2

## 2025-05-23 LAB
ANION GAP SERPL CALCULATED.3IONS-SCNC: 11 MMOL/L (ref 7–16)
BASOPHILS # BLD: 0.02 K/UL (ref 0–0.2)
BASOPHILS NFR BLD: 0 % (ref 0–2)
BUN SERPL-MCNC: 17 MG/DL (ref 8–23)
CALCIUM SERPL-MCNC: 9.4 MG/DL (ref 8.8–10.2)
CHLORIDE SERPL-SCNC: 106 MMOL/L (ref 98–107)
CO2 SERPL-SCNC: 24 MMOL/L (ref 22–29)
CREAT SERPL-MCNC: 1.1 MG/DL (ref 0.7–1.2)
EOSINOPHIL # BLD: 0.16 K/UL (ref 0.05–0.5)
EOSINOPHILS RELATIVE PERCENT: 3 % (ref 0–6)
ERYTHROCYTE [DISTWIDTH] IN BLOOD BY AUTOMATED COUNT: 12.5 % (ref 11.5–15)
GFR, ESTIMATED: 71 ML/MIN/1.73M2
GLUCOSE BLD-MCNC: 155 MG/DL (ref 74–99)
GLUCOSE BLD-MCNC: 222 MG/DL (ref 74–99)
GLUCOSE SERPL-MCNC: 160 MG/DL (ref 74–99)
HCT VFR BLD AUTO: 31.8 % (ref 37–54)
HGB BLD-MCNC: 10.9 G/DL (ref 12.5–16.5)
IMM GRANULOCYTES # BLD AUTO: <0.03 K/UL (ref 0–0.58)
IMM GRANULOCYTES NFR BLD: 0 % (ref 0–5)
LYMPHOCYTES NFR BLD: 2.13 K/UL (ref 1.5–4)
LYMPHOCYTES RELATIVE PERCENT: 39 % (ref 20–42)
MCH RBC QN AUTO: 30.7 PG (ref 26–35)
MCHC RBC AUTO-ENTMCNC: 34.3 G/DL (ref 32–34.5)
MCV RBC AUTO: 89.6 FL (ref 80–99.9)
MICROORGANISM SPEC CULT: ABNORMAL
MICROORGANISM SPEC CULT: ABNORMAL
MICROORGANISM/AGENT SPEC: ABNORMAL
MONOCYTES NFR BLD: 0.43 K/UL (ref 0.1–0.95)
MONOCYTES NFR BLD: 8 % (ref 2–12)
NEUTROPHILS NFR BLD: 49 % (ref 43–80)
NEUTS SEG NFR BLD: 2.66 K/UL (ref 1.8–7.3)
PLATELET # BLD AUTO: 240 K/UL (ref 130–450)
PMV BLD AUTO: 10 FL (ref 7–12)
POTASSIUM SERPL-SCNC: 4.3 MMOL/L (ref 3.5–5.1)
RBC # BLD AUTO: 3.55 M/UL (ref 3.8–5.8)
SERVICE CMNT-IMP: ABNORMAL
SODIUM SERPL-SCNC: 141 MMOL/L (ref 136–145)
SPECIMEN DESCRIPTION: ABNORMAL
WBC OTHER # BLD: 5.4 K/UL (ref 4.5–11.5)

## 2025-05-23 PROCEDURE — 2500000003 HC RX 250 WO HCPCS: Performed by: NURSE PRACTITIONER

## 2025-05-23 PROCEDURE — 2500000003 HC RX 250 WO HCPCS: Performed by: PHYSICIAN ASSISTANT

## 2025-05-23 PROCEDURE — C1751 CATH, INF, PER/CENT/MIDLINE: HCPCS

## 2025-05-23 PROCEDURE — 2500000003 HC RX 250 WO HCPCS: Performed by: SPECIALIST

## 2025-05-23 PROCEDURE — 6370000000 HC RX 637 (ALT 250 FOR IP): Performed by: NURSE PRACTITIONER

## 2025-05-23 PROCEDURE — 36415 COLL VENOUS BLD VENIPUNCTURE: CPT

## 2025-05-23 PROCEDURE — 6360000002 HC RX W HCPCS: Performed by: PHYSICIAN ASSISTANT

## 2025-05-23 PROCEDURE — 36569 INSJ PICC 5 YR+ W/O IMAGING: CPT

## 2025-05-23 PROCEDURE — 82962 GLUCOSE BLOOD TEST: CPT

## 2025-05-23 PROCEDURE — 6370000000 HC RX 637 (ALT 250 FOR IP): Performed by: PHYSICIAN ASSISTANT

## 2025-05-23 PROCEDURE — 80048 BASIC METABOLIC PNL TOTAL CA: CPT

## 2025-05-23 PROCEDURE — 76937 US GUIDE VASCULAR ACCESS: CPT

## 2025-05-23 PROCEDURE — 85025 COMPLETE CBC W/AUTO DIFF WBC: CPT

## 2025-05-23 RX ORDER — FLUCONAZOLE 200 MG/1
200 TABLET ORAL DAILY
Qty: 7 TABLET | Refills: 0 | Status: SHIPPED | OUTPATIENT
Start: 2025-05-23 | End: 2025-05-30

## 2025-05-23 RX ORDER — FLUCONAZOLE 100 MG/1
200 TABLET ORAL DAILY
Status: DISCONTINUED | OUTPATIENT
Start: 2025-05-23 | End: 2025-05-23 | Stop reason: HOSPADM

## 2025-05-23 RX ADMIN — LISINOPRIL 20 MG: 20 TABLET ORAL at 09:11

## 2025-05-23 RX ADMIN — ENOXAPARIN SODIUM 40 MG: 100 INJECTION SUBCUTANEOUS at 09:11

## 2025-05-23 RX ADMIN — SODIUM CHLORIDE, PRESERVATIVE FREE 10 ML: 5 INJECTION INTRAVENOUS at 09:11

## 2025-05-23 RX ADMIN — ALLOPURINOL 300 MG: 300 TABLET ORAL at 09:11

## 2025-05-23 RX ADMIN — PANTOPRAZOLE SODIUM 40 MG: 40 TABLET, DELAYED RELEASE ORAL at 05:31

## 2025-05-23 RX ADMIN — SODIUM CHLORIDE, PRESERVATIVE FREE 10 ML: 5 INJECTION INTRAVENOUS at 09:12

## 2025-05-23 RX ADMIN — Medication 1 TABLET: at 09:11

## 2025-05-23 RX ADMIN — MICONAZOLE NITRATE: 20.6 POWDER TOPICAL at 11:51

## 2025-05-23 RX ADMIN — FLUCONAZOLE 200 MG: 100 TABLET ORAL at 11:52

## 2025-05-23 RX ADMIN — ASPIRIN 81 MG CHEWABLE TABLET 81 MG: 81 TABLET CHEWABLE at 09:11

## 2025-05-23 NOTE — CARE COORDINATION
CM Update: Met with patient at bedside to discuss transition of care plan. Discharge plan is Home with [facility] for home IV antibiotics. Awaiting PICC Line placement. HHC and antibiotics to be at patient's home today at 3 pm. Informed attending. CM/MICHAEL to follow. Allen Mazariegos [phone]

## 2025-05-23 NOTE — PROGRESS NOTES
IV team messaged via perfect serve regarding PICC placement.    Electronically signed by Vickey Delvalle RN on 5/23/25 at 8:18 AM EDT

## 2025-05-23 NOTE — ACP (ADVANCE CARE PLANNING)
Advance Care Planning   Healthcare Decision Maker:    Primary Decision Maker: Billie Nash - [facility] - [phone]    Click here to complete Healthcare Decision Makers including selection of the Healthcare Decision Maker Relationship (ie \"Primary\").

## 2025-05-23 NOTE — DISCHARGE SUMMARY
[facility]   Discharge summary   Patient ID:  Bob Nash  [id number]  75 y.o.  1950    Admit date: 5/20/2025    Discharge date and time: 5/23/2025    Admission Diagnoses:   Patient Active Problem List   Diagnosis    Failure of dental implant due to periodontal infection    Other chest pain    Dental abscess       Discharge Diagnoses: failure of dental implant due to periodontal disease     Consults: ID and OFMS    Procedures: none    Hospital Course: The patient is a 75 y.o. male of Allen Acosta MD   Patient is a 75-year-old male admitted to [facility] for  Failure of dental implant due to periodontal infection  -Monitor labs  -Remains on IV Invanz per infectious disease  -PICC line ordered for ongoing antibiotics as an outpatient  -[facility] to start ABX therapy on Saturday, will receive last dose tomorrow and can be discharged thereafter  -Labs stable    5/23/25  - PICC line placed  - Patient to continue IV Invanz per infectious disease  - P.o. Diflucan 200 mg daily x 7 days also ordered by infectious disease  - Home health care set up with case management to administer antibiotics at home  - Follow-up with infectious disease outpatient at their discretion  - Follow-up with PCP within 1 week of discharge      Recent Labs     05/21/25  1158 05/22/25  0411 05/23/25  0423   WBC 6.0 6.1 5.4   HGB 11.2* 10.8* 10.9*   HCT 32.4* 31.7* 31.8*    245 240       Recent Labs     05/20/25  2041 05/22/25  0411 05/23/25  0423    141 141   K 4.6 4.1 4.3    106 106   CO2 22 22 24   BUN 23 14 17   CREATININE 1.3* 1.0 1.1   CALCIUM 10.2 9.4 9.4       XR CHEST PORTABLE  Result Date: 5/20/2025  EXAMINATION: ONE XRAY VIEW OF THE CHEST 5/20/2025 9:49 pm COMPARISON: None. HISTORY: ORDERING SYSTEM PROVIDED HISTORY: chest pain TECHNOLOGIST PROVIDED HISTORY: Reason for exam:->chest pain FINDINGS: The lungs are without acute focal process.  There is no effusion or pneumothorax. The cardiomediastinal

## 2025-05-23 NOTE — PROGRESS NOTES
CHG double lumen power picc Placement 5/23/2025    Product number: [id number]   Lot Number: [id number]      Ultrasound: yes   Left Basilic vein:                Upper Arm Circumference: (CM) 33    Size:(FR)/GUAGE 5.5/43 cm    Exposed Length: (CM) 1    Internal Length: (CM) 42   Cut: (CM) 12   Vein Measurement: 0.56 cm              Lidocaine Given: yes lidocaine 1% (from picc kit)    Josey Najera RN  5/23/2025  11:37 AM    CHG double lumen power picc inserted using the VPS guidance system. Tip placed at the lower 1/3 of the SVC/CAJ. Stefano dobson.

## 2025-05-23 NOTE — PROGRESS NOTES
PIV x2 removed, no complications, discharge information given to patient, information reviewed, all questions answered

## 2025-05-23 NOTE — PLAN OF CARE
Problem: Discharge Planning  Goal: Discharge to home or other facility with appropriate resources  5/23/2025 0928 by Maria E Lovett, RN  Outcome: Progressing  5/22/2025 2134 by Batsheva Stock, RN  Outcome: Progressing  Flowsheets (Taken 5/22/2025 2000)  Discharge to home or other facility with appropriate resources:   Identify barriers to discharge with patient and caregiver   Identify discharge learning needs (meds, wound care, etc)     Problem: Pain  Goal: Verbalizes/displays adequate comfort level or baseline comfort level  5/23/2025 0928 by Maria E Lovett, RN  Outcome: Progressing  5/22/2025 2134 by Batsheva Stock, RN  Outcome: Progressing

## 2025-05-23 NOTE — PROGRESS NOTES
Occupational Therapy  Received OT order, Reviewed Chart.  Presented to pt's room to find pt IND'ly completing ADLs, Transfers and Mobility w/o the use of any DME/AD/AE.  Spoke with pt who reported having no concerns about returning home.  Denied any need for therapy or Equipment at d/c.  Pt and therapist in agreement to have OT order discontinued at this time.  Please re-order should any needs arise.  Thank you for this referral. Fallon Gunter, CLARIBEL, OTR/L  # [id number]

## 2025-05-23 NOTE — PROGRESS NOTES
[facility]  [facility]  Progress Note      Chief Complaint   Patient presents with    Dental Pain     Ongoing issue with dental implants. Pain for the past 3 weeks, stated his dentist told him to come to the ER to get IV antibiotics . Worried about a dental infection        SUBJECTIVE:  Patient is tolerating medications. No reported adverse drug reactions.  No nausea, vomiting, diarrhea.  Pain managed  Reports rash groin and under arms present for months. He has been using OTC antifungal spray with no improvement. Reports blood sugar mostly controlled. Does have moisture arnel area at times.    Review of systems:  As stated above in the chief complaint, otherwise negative.    Medications:  Scheduled Meds:   lidocaine 1 % injection  5 mL IntraDERmal Once    sodium chloride flush  5-40 mL IntraVENous 2 times per day    allopurinol  300 mg Oral Daily    carvedilol  25 mg Oral BID WC    lisinopril  20 mg Oral BID    therapeutic multivitamin-minerals  1 tablet Oral Daily    pantoprazole  40 mg Oral QAM AC    sodium chloride flush  5-40 mL IntraVENous 2 times per day    enoxaparin  40 mg SubCUTAneous Daily    aspirin  81 mg Oral Daily    insulin lispro  0-4 Units SubCUTAneous 4x Daily AC & HS    ertapenem (INVanz) IV  1,000 mg IntraVENous Q24H     Continuous Infusions:   sodium chloride      sodium chloride       PRN Meds:sodium chloride flush, sodium chloride, sodium chloride flush, sodium chloride, promethazine **OR** ondansetron, polyethylene glycol, oxyCODONE, oxyCODONE, HYDROmorphone    OBJECTIVE:  BP (!) 150/62   Pulse 58   Temp 97.6 °F (36.4 °C) (Temporal)   Resp 18   Ht 1.803 m (5' 11\")   Wt 91.6 kg (202 lb)   SpO2 97%   BMI 28.17 kg/m²   Temp  Av.9 °F (36.6 °C)  Min: 97.6 °F (36.4 °C)  Max: 98.2 °F (36.8 °C)  Constitutional: The patient is awake, alert, and oriented.   Skin: Warm and dry. No rashes were noted. Candida intertrigo groin  HEENT:   Moist mucous membranes.  No

## 2025-05-23 NOTE — PROGRESS NOTES
CLINICAL PHARMACY NOTE: MEDS TO BEDS    Total # of Prescriptions Filled: 2   The following medications were delivered to the patient:  Antifungal 2% powder   Diflucan 200mg tabs    Additional Documentation:  To pt

## 2025-05-27 LAB
MICROORGANISM SPEC CULT: NORMAL
MICROORGANISM SPEC CULT: NORMAL
SERVICE CMNT-IMP: NORMAL
SERVICE CMNT-IMP: NORMAL
SPECIMEN DESCRIPTION: NORMAL
SPECIMEN DESCRIPTION: NORMAL
SURGICAL PATHOLOGY REPORT: NORMAL

## 2025-06-18 ENCOUNTER — TELEPHONE (OUTPATIENT)
Dept: INFUSION THERAPY | Age: 75
End: 2025-06-18

## 2025-06-20 ENCOUNTER — HOSPITAL ENCOUNTER (OUTPATIENT)
Dept: INFUSION THERAPY | Age: 75
Setting detail: INFUSION SERIES
Discharge: HOME | End: 2025-06-20
Payer: MEDICARE

## 2025-06-20 PROCEDURE — 6360000002 HC RX W HCPCS: Performed by: SPECIALIST

## 2025-06-20 PROCEDURE — 36410 VNPNXR 3YR/> PHY/QHP DX/THER: CPT

## 2025-06-20 PROCEDURE — 76937 US GUIDE VASCULAR ACCESS: CPT

## 2025-06-20 PROCEDURE — 2500000003 HC RX 250 WO HCPCS: Performed by: SPECIALIST

## 2025-06-20 PROCEDURE — C1751 CATH, INF, PER/CENT/MIDLINE: HCPCS

## 2025-06-20 RX ORDER — LIDOCAINE HYDROCHLORIDE 10 MG/ML
50 INJECTION, SOLUTION EPIDURAL; INFILTRATION; INTRACAUDAL; PERINEURAL ONCE
Status: DISCONTINUED | OUTPATIENT
Start: 2025-06-20 | End: 2025-06-20

## 2025-06-20 RX ORDER — SODIUM CHLORIDE 0.9 % (FLUSH) 0.9 %
5-40 SYRINGE (ML) INJECTION PRN
Status: DISCONTINUED | OUTPATIENT
Start: 2025-06-20 | End: 2025-06-20

## 2025-06-20 RX ORDER — SODIUM CHLORIDE 9 MG/ML
INJECTION, SOLUTION INTRAVENOUS PRN
Status: DISCONTINUED | OUTPATIENT
Start: 2025-06-20 | End: 2025-06-21 | Stop reason: HOSPADM

## 2025-06-20 RX ORDER — SODIUM CHLORIDE 9 MG/ML
INJECTION, SOLUTION INTRAVENOUS PRN
Status: DISCONTINUED | OUTPATIENT
Start: 2025-06-20 | End: 2025-06-20

## 2025-06-20 RX ORDER — SODIUM CHLORIDE 0.9 % (FLUSH) 0.9 %
5-40 SYRINGE (ML) INJECTION EVERY 12 HOURS SCHEDULED
Status: DISCONTINUED | OUTPATIENT
Start: 2025-06-20 | End: 2025-06-21 | Stop reason: HOSPADM

## 2025-06-20 RX ORDER — SODIUM CHLORIDE 0.9 % (FLUSH) 0.9 %
5-40 SYRINGE (ML) INJECTION EVERY 12 HOURS SCHEDULED
Status: DISCONTINUED | OUTPATIENT
Start: 2025-06-20 | End: 2025-06-20

## 2025-06-20 RX ORDER — LIDOCAINE HYDROCHLORIDE 10 MG/ML
50 INJECTION, SOLUTION EPIDURAL; INFILTRATION; INTRACAUDAL; PERINEURAL ONCE
Status: COMPLETED | OUTPATIENT
Start: 2025-06-20 | End: 2025-06-20

## 2025-06-20 RX ORDER — SODIUM CHLORIDE 0.9 % (FLUSH) 0.9 %
5-40 SYRINGE (ML) INJECTION PRN
Status: DISCONTINUED | OUTPATIENT
Start: 2025-06-20 | End: 2025-06-21 | Stop reason: HOSPADM

## 2025-06-20 RX ADMIN — LIDOCAINE HYDROCHLORIDE 10 MG: 10 SOLUTION INTRAVENOUS at 13:24

## 2025-06-20 RX ADMIN — SODIUM CHLORIDE, PRESERVATIVE FREE 10 ML: 5 INJECTION INTRAVENOUS at 13:24

## 2025-06-20 NOTE — PROCEDURES
PROCEDURE NOTE  Date: 6/20/2025   Name: Bob Nash  YOB: 1950    Midline    Catheter insertion date: 6/20/2025     Product Number:  [id number]   Lot No: [id number]   Gauge: 17   Lumen: SINGLE, 4.5 Fr   Right brachial    Vein Diameter: 0.44 cm   Arm circumference: 28 cm   Catheter Length: 15 cm   Internal Length: 15 cm   External Length 0  Midline is okay to use.   : JOSE LUIS RUBIO-BC

## 2025-06-22 NOTE — PROGRESS NOTES
Physician Progress Note      PATIENT:               EVARISTO CHOE  CSN #:                  [id number]  :                       1950  ADMIT DATE:       2025 7:59 PM  DISCH DATE:        2025 2:32 PM  RESPONDING  PROVIDER #:        Chrissy Jernigan MD          QUERY TEXT:    Chest pain is documented in the medical record in H&P. Please specify the   underlying cause:    The clinical indicators include:  -- Patient differential includes dental infection as well as endocarditis as   well as Myocard infarction as well as angina as well as PE.  Also considered   osteomyelitis. EKG was 74 sinus rhythm no acute ST elevation compared to EKG   from 2025.  Patient was ordered Roxicodone also ordered Toradol for pain   also given IV doxycycline.  Patient also given aspirin due to his chest pain.   (ED note - [facility])  --Trop 25, 20  Options provided:  -- Chest pain related to Myocardial injury without infarction due to failed   dental implant/infection  -- Clinically insignificant troponin elevation  -- Other - I will add my own diagnosis  -- Disagree - Not applicable / Not valid  -- Disagree - Clinically unable to determine / Unknown  -- Refer to Clinical Documentation Reviewer    PROVIDER RESPONSE TEXT:    This patient has an Myocardial injury without infarction due to failed dental   implant/infection    Query created by: Jerry Parrish on 2025 2:11 PM      QUERY TEXT:    Osteomyelitis was documented in the medical record in [facility] . The diagnosis   was not noted in subsequent documentation.  Please clarify the status of this   condition.    The clinical indicators include:  --   Osteomyelitis status post denture implantation failure.  Continue Invanz   for 42 days. PICC line to be placed.  (-)  --Failure of dental implant due to periodontal infection. [facility] to start ABX   therapy on Saturday, will receive last dose tomorrow and can be discharged   thereafter. (dc summary- )  --CT

## 2025-08-20 ENCOUNTER — HOSPITAL ENCOUNTER (OUTPATIENT)
Dept: GENERAL RADIOLOGY | Age: 75
Discharge: HOME | End: 2025-08-22
Payer: MEDICARE

## 2025-08-20 ENCOUNTER — TRANSCRIBE ORDERS (OUTPATIENT)
Dept: GENERAL RADIOLOGY | Age: 75
End: 2025-08-20

## 2025-08-20 DIAGNOSIS — M25.511 RIGHT SHOULDER PAIN, UNSPECIFIED CHRONICITY: ICD-10-CM

## 2025-08-20 DIAGNOSIS — M25.562 LEFT KNEE PAIN, UNSPECIFIED CHRONICITY: ICD-10-CM

## 2025-08-20 DIAGNOSIS — M25.511 RIGHT SHOULDER PAIN, UNSPECIFIED CHRONICITY: Primary | ICD-10-CM

## 2025-08-20 PROCEDURE — 73030 X-RAY EXAM OF SHOULDER: CPT

## 2025-08-20 PROCEDURE — 73564 X-RAY EXAM KNEE 4 OR MORE: CPT
